# Patient Record
Sex: MALE | Race: WHITE | NOT HISPANIC OR LATINO | Employment: OTHER | URBAN - METROPOLITAN AREA
[De-identification: names, ages, dates, MRNs, and addresses within clinical notes are randomized per-mention and may not be internally consistent; named-entity substitution may affect disease eponyms.]

---

## 2017-03-13 ENCOUNTER — TRANSCRIBE ORDERS (OUTPATIENT)
Dept: ADMINISTRATIVE | Facility: HOSPITAL | Age: 52
End: 2017-03-13

## 2017-03-13 ENCOUNTER — OFFICE (OUTPATIENT)
Dept: URBAN - METROPOLITAN AREA CLINIC 75 | Facility: CLINIC | Age: 52
End: 2017-03-13

## 2017-03-13 VITALS
HEIGHT: 70 IN | SYSTOLIC BLOOD PRESSURE: 158 MMHG | HEART RATE: 68 BPM | WEIGHT: 223 LBS | DIASTOLIC BLOOD PRESSURE: 86 MMHG

## 2017-03-13 DIAGNOSIS — R68.81 EARLY SATIETY: ICD-10-CM

## 2017-03-13 DIAGNOSIS — R10.13 EPIGASTRIC PAIN: Primary | ICD-10-CM

## 2017-03-13 DIAGNOSIS — R10.13 EPIGASTRIC PAIN: ICD-10-CM

## 2017-03-13 DIAGNOSIS — K21.9 GASTRO-ESOPHAGEAL REFLUX DISEASE WITHOUT ESOPHAGITIS: ICD-10-CM

## 2017-03-13 PROCEDURE — 99244 OFF/OP CNSLTJ NEW/EST MOD 40: CPT | Performed by: INTERNAL MEDICINE

## 2017-03-13 RX ORDER — PANTOPRAZOLE SODIUM 40 MG/1
TABLET, DELAYED RELEASE ORAL
Qty: 60 | Refills: 12 | Status: COMPLETED
End: 2020-01-22

## 2017-03-16 ENCOUNTER — HOSPITAL ENCOUNTER (OUTPATIENT)
Dept: NUCLEAR MEDICINE | Facility: HOSPITAL | Age: 52
Discharge: HOME | End: 2017-03-16

## 2017-03-16 VITALS — BODY MASS INDEX: 31.57 KG/M2 | WEIGHT: 220 LBS

## 2017-03-16 DIAGNOSIS — R10.13 EPIGASTRIC PAIN: ICD-10-CM

## 2017-03-16 PROCEDURE — 25010000002 SINCALIDE PER 5 MCG: Performed by: INTERNAL MEDICINE

## 2017-03-16 PROCEDURE — A9537 TC99M MEBROFENIN: HCPCS | Performed by: INTERNAL MEDICINE

## 2017-03-16 PROCEDURE — 0 TECHNETIUM TC 99M MEBROFENIN KIT: Performed by: INTERNAL MEDICINE

## 2017-03-16 PROCEDURE — 78227 HEPATOBIL SYST IMAGE W/DRUG: CPT

## 2017-03-16 RX ORDER — KIT FOR THE PREPARATION OF TECHNETIUM TC 99M MEBROFENIN 45 MG/10ML
1 INJECTION, POWDER, LYOPHILIZED, FOR SOLUTION INTRAVENOUS
Status: COMPLETED | OUTPATIENT
Start: 2017-03-16 | End: 2017-03-16

## 2017-03-16 RX ADMIN — SINCALIDE 2 MCG: 5 INJECTION, POWDER, LYOPHILIZED, FOR SOLUTION INTRAVENOUS at 14:15

## 2017-03-16 RX ADMIN — MEBROFENIN 1 DOSE: 45 INJECTION, POWDER, LYOPHILIZED, FOR SOLUTION INTRAVENOUS at 13:15

## 2017-03-22 ENCOUNTER — HOSPITAL ENCOUNTER (OUTPATIENT)
Dept: NUCLEAR MEDICINE | Facility: HOSPITAL | Age: 52
Discharge: HOME | End: 2017-03-22

## 2017-03-22 DIAGNOSIS — R10.13 EPIGASTRIC PAIN: ICD-10-CM

## 2017-03-22 PROCEDURE — A9541 TC99M SULFUR COLLOID: HCPCS | Performed by: INTERNAL MEDICINE

## 2017-03-22 PROCEDURE — 0 TECHNETIUM SULFUR COLLOID: Performed by: INTERNAL MEDICINE

## 2017-03-22 PROCEDURE — 78264 GASTRIC EMPTYING IMG STUDY: CPT

## 2017-03-22 RX ADMIN — Medication 1 DOSE: at 07:50

## 2017-06-13 ENCOUNTER — TRANSCRIBE ORDERS (OUTPATIENT)
Dept: ADMINISTRATIVE | Facility: HOSPITAL | Age: 52
End: 2017-06-13

## 2017-06-13 DIAGNOSIS — R10.13 ABDOMINAL PAIN, EPIGASTRIC: Primary | ICD-10-CM

## 2017-06-21 ENCOUNTER — TRANSCRIBE ORDERS (OUTPATIENT)
Dept: LAB | Facility: HOSPITAL | Age: 52
End: 2017-06-21

## 2017-06-21 ENCOUNTER — HOSPITAL ENCOUNTER (OUTPATIENT)
Dept: GENERAL RADIOLOGY | Facility: HOSPITAL | Age: 52
Discharge: HOME | End: 2017-06-21
Admitting: INTERNAL MEDICINE

## 2017-06-21 ENCOUNTER — LAB (OUTPATIENT)
Dept: LAB | Facility: HOSPITAL | Age: 52
End: 2017-06-21

## 2017-06-21 DIAGNOSIS — R10.13 ABDOMINAL PAIN, EPIGASTRIC: Primary | ICD-10-CM

## 2017-06-21 DIAGNOSIS — R10.13 ABDOMINAL PAIN, EPIGASTRIC: ICD-10-CM

## 2017-06-21 DIAGNOSIS — IMO0001 BLOAT: ICD-10-CM

## 2017-06-21 PROCEDURE — 83516 IMMUNOASSAY NONANTIBODY: CPT

## 2017-06-21 PROCEDURE — 36415 COLL VENOUS BLD VENIPUNCTURE: CPT

## 2017-06-21 PROCEDURE — 74250 X-RAY XM SM INT 1CNTRST STD: CPT

## 2017-06-22 LAB
ENDOMYSIUM IGA SER QL: NEGATIVE
GLIADIN PEPTIDE IGA SER-ACNC: 9 UNITS (ref 0–19)
GLIADIN PEPTIDE IGG SER-ACNC: 3 UNITS (ref 0–19)
IGA SERPL-MCNC: 212 MG/DL (ref 90–386)
TTG IGA SER-ACNC: <2 U/ML (ref 0–3)
TTG IGG SER-ACNC: 3 U/ML (ref 0–5)

## 2017-07-10 RX ORDER — PANTOPRAZOLE SODIUM 40 MG/1
TABLET, DELAYED RELEASE ORAL
Qty: 30 TABLET | Refills: 0 | OUTPATIENT
Start: 2017-07-10

## 2017-07-18 RX ORDER — PANTOPRAZOLE SODIUM 40 MG/1
TABLET, DELAYED RELEASE ORAL
Qty: 30 TABLET | Refills: 0 | OUTPATIENT
Start: 2017-07-18

## 2020-01-22 ENCOUNTER — OFFICE (OUTPATIENT)
Dept: URBAN - METROPOLITAN AREA CLINIC 75 | Facility: CLINIC | Age: 55
End: 2020-01-22
Payer: MEDICARE

## 2020-01-22 VITALS
DIASTOLIC BLOOD PRESSURE: 84 MMHG | HEIGHT: 70 IN | RESPIRATION RATE: 18 BRPM | WEIGHT: 200 LBS | SYSTOLIC BLOOD PRESSURE: 134 MMHG

## 2020-01-22 DIAGNOSIS — K21.9 GASTRO-ESOPHAGEAL REFLUX DISEASE WITHOUT ESOPHAGITIS: ICD-10-CM

## 2020-01-22 DIAGNOSIS — R10.13 EPIGASTRIC PAIN: ICD-10-CM

## 2020-01-22 DIAGNOSIS — R68.81 EARLY SATIETY: ICD-10-CM

## 2020-01-22 DIAGNOSIS — R14.0 ABDOMINAL DISTENSION (GASEOUS): ICD-10-CM

## 2020-01-22 PROCEDURE — 99214 OFFICE O/P EST MOD 30 MIN: CPT | Performed by: NURSE PRACTITIONER

## 2021-03-08 ENCOUNTER — OFFICE (OUTPATIENT)
Dept: URBAN - METROPOLITAN AREA CLINIC 75 | Facility: CLINIC | Age: 56
End: 2021-03-08

## 2021-03-08 VITALS
HEIGHT: 70 IN | DIASTOLIC BLOOD PRESSURE: 82 MMHG | HEART RATE: 82 BPM | OXYGEN SATURATION: 97 % | WEIGHT: 214 LBS | RESPIRATION RATE: 12 BRPM | SYSTOLIC BLOOD PRESSURE: 122 MMHG | TEMPERATURE: 97.4 F

## 2021-03-08 DIAGNOSIS — R14.0 ABDOMINAL DISTENSION (GASEOUS): ICD-10-CM

## 2021-03-08 DIAGNOSIS — K59.00 CONSTIPATION, UNSPECIFIED: ICD-10-CM

## 2021-03-08 DIAGNOSIS — K21.9 GASTRO-ESOPHAGEAL REFLUX DISEASE WITHOUT ESOPHAGITIS: ICD-10-CM

## 2021-03-08 PROCEDURE — 99214 OFFICE O/P EST MOD 30 MIN: CPT | Performed by: INTERNAL MEDICINE

## 2021-03-08 RX ORDER — LINACLOTIDE 290 UG/1
CAPSULE, GELATIN COATED ORAL
Qty: 30 | Refills: 5 | Status: ACTIVE
Start: 2021-03-08

## 2021-04-01 ENCOUNTER — TRANSCRIBE ORDERS (OUTPATIENT)
Dept: ADMINISTRATIVE | Facility: HOSPITAL | Age: 56
End: 2021-04-01

## 2021-04-01 DIAGNOSIS — R14.0 BLOATING: Primary | ICD-10-CM

## 2021-04-13 ENCOUNTER — APPOINTMENT (OUTPATIENT)
Dept: NUCLEAR MEDICINE | Facility: HOSPITAL | Age: 56
End: 2021-04-13

## 2021-04-20 ENCOUNTER — HOSPITAL ENCOUNTER (OUTPATIENT)
Dept: NUCLEAR MEDICINE | Facility: HOSPITAL | Age: 56
Discharge: HOME | End: 2021-04-20

## 2021-04-20 DIAGNOSIS — R14.0 BLOATING: ICD-10-CM

## 2021-04-20 PROCEDURE — 0 TECHNETIUM TC 99M MEBROFENIN KIT: Performed by: INTERNAL MEDICINE

## 2021-04-20 PROCEDURE — 78226 HEPATOBILIARY SYSTEM IMAGING: CPT

## 2021-04-20 PROCEDURE — A9537 TC99M MEBROFENIN: HCPCS | Performed by: INTERNAL MEDICINE

## 2021-04-20 RX ORDER — KIT FOR THE PREPARATION OF TECHNETIUM TC 99M MEBROFENIN 45 MG/10ML
1 INJECTION, POWDER, LYOPHILIZED, FOR SOLUTION INTRAVENOUS
Status: COMPLETED | OUTPATIENT
Start: 2021-04-20 | End: 2021-04-20

## 2021-04-20 RX ADMIN — MEBROFENIN 1 DOSE: 45 INJECTION, POWDER, LYOPHILIZED, FOR SOLUTION INTRAVENOUS at 09:58

## 2021-05-20 ENCOUNTER — IMMUNIZATION (OUTPATIENT)
Dept: VACCINE CLINIC | Facility: HOSPITAL | Age: 56
End: 2021-05-20

## 2021-05-20 PROCEDURE — 91300 HC SARSCOV02 VAC 30MCG/0.3ML IM: CPT | Performed by: INTERNAL MEDICINE

## 2021-05-20 PROCEDURE — 0001A: CPT | Performed by: INTERNAL MEDICINE

## 2021-06-18 ENCOUNTER — APPOINTMENT (OUTPATIENT)
Dept: VACCINE CLINIC | Facility: HOSPITAL | Age: 56
End: 2021-06-18

## 2021-06-26 ENCOUNTER — APPOINTMENT (OUTPATIENT)
Dept: VACCINE CLINIC | Facility: HOSPITAL | Age: 56
End: 2021-06-26

## 2024-03-04 NOTE — H&P (VIEW-ONLY)
Subjective   Patient ID: Rajesh Springer is a 58 y.o. male is being seen for consultation today at the request of Holger Oliveira MD for 2nd opinion-cervical spinal stenosis of spinal canal. Patient had a MRI C-spine on 1/31/2024 @ [facility].    Treatment: No recent treatment    Today patient states that he has neck pain that radiates to B/L shoulders    History of Present Illness    Patient has been having pain in his neck with radiation into his shoulder since 2018.  He has done physical therapy which made him worse.  He has seen a chiropractor which did not help either.  He has never had any injections.  The pain is located in his neck and radiates into both of his shoulders.  It really does not radiate down his arms.    The following portions of the patient's history were reviewed and updated as appropriate: allergies, current medications, past family history, past medical history, past social history, past surgical history, and problem list.    Review of Systems   Constitutional:  Negative for chills and fever.   HENT:  Negative for congestion.    Musculoskeletal:  Positive for myalgias, neck pain and neck stiffness.   Neurological:  Negative for weakness, numbness and headaches.       I reviewed the review of systems listed by the patient and discussed by my MA    Objective     There were no vitals filed for this visit.  There is no height or weight on file to calculate BMI.    Tobacco Use: Medium Risk (3/12/2024)    Patient History     Smoking Tobacco Use: Former     Smokeless Tobacco Use: Unknown     Passive Exposure: Not on file          Physical Exam  Constitutional:       Appearance: He is well-developed.   HENT:      Head: Normocephalic and atraumatic.   Eyes:      Extraocular Movements: EOM normal.      Conjunctiva/sclera: Conjunctivae normal.      Pupils: Pupils are equal, round, and reactive to light.   Neck:      Vascular: No carotid bruit.   Neurological:      Mental Status: He is  oriented to person, place, and time.      Coordination: Finger-Nose-Finger Test and Heel to Shin Test normal.      Gait: Gait is intact.      Deep Tendon Reflexes:      Reflex Scores:       Tricep reflexes are 2+ on the right side and 2+ on the left side.       Bicep reflexes are 2+ on the right side and 2+ on the left side.       Brachioradialis reflexes are 2+ on the right side and 2+ on the left side.       Patellar reflexes are 2+ on the right side and 2+ on the left side.       Achilles reflexes are 2+ on the right side and 2+ on the left side.  Psychiatric:         Speech: Speech normal.       Neurologic Exam     Mental Status   Oriented to person, place, and time.   Registration of memory: Good recent and remote memory.   Attention: normal. Concentration: normal.   Speech: speech is normal   Level of consciousness: alert  Knowledge: consistent with education.     Cranial Nerves     CN II   Visual fields full to confrontation.   Visual acuity: normal    CN III, IV, VI   Pupils are equal, round, and reactive to light.  Extraocular motions are normal.     CN V   Facial sensation intact.   Right corneal reflex: normal  Left corneal reflex: normal    CN VII   Facial expression full, symmetric.   Right facial weakness: none  Left facial weakness: none    CN VIII   Hearing: intact    CN IX, X   Palate: symmetric    CN XI   Right sternocleidomastoid strength: normal  Left sternocleidomastoid strength: normal    CN XII   Tongue: not atrophic  Tongue deviation: none    Motor Exam   Muscle bulk: normal  Right arm tone: normal  Left arm tone: normal  Right leg tone: normal  Left leg tone: normal    Strength   Strength 5/5 except as noted.     Sensory Exam   Light touch normal.     Gait, Coordination, and Reflexes     Gait  Gait: normal    Coordination   Finger to nose coordination: normal  Heel to shin coordination: normal    Reflexes   Right brachioradialis: 2+  Left brachioradialis: 2+  Right biceps: 2+  Left biceps:  2+  Right triceps: 2+  Left triceps: 2+  Right patellar: 2+  Left patellar: 2+  Right achilles: 2+  Left achilles: 2+  Right : 2+  Left : 2+          Assessment & Plan   Independent Review of Radiographic Studies:      I personally reviewed the images from the following studies.    I reviewed an MRI of the cervical spine done on January 31 of this year.  On the sagittal images there is some narrowing at C4-5 and C5-6 particularly.  On the axial images C2-3 is widely open.  C3-4 is open as well.  C4-5 does show a midline disc bulge that is distorting the cord a little bit but not severely so.  The foramina are fairly open.  C5-6 shows more seen difficult narrowing with some cord compression neuroforaminal stenosis worse on the left than the right.  C6-7 shows bilateral foraminal stenosis and C7-T1 really looks pretty good.    Medical Decision Making:      I told the patient that because of the cord compression at C5-6 I think we should get a myelogram done to be sure about how much pressure there is on the spinal cord.  I told the patient what a myelogram involves.  I explained that there is a 50% chance of developing a bad headache and nausea as a result of the test.  I explained that there is also a very small chance of infection, seizures, and bleeding.  I explained how we would treat a post myelogram headache including bedrest, caffeinated fluids, steroids, and blood patch.  The patient does ask to proceed.    Diagnoses and all orders for this visit:    1. Cervical spinal stenosis (Primary)  -     Obtain Informed Consent; Standing  -     IR Myelogram Cervical Spine; Future  -     CT Cervical Spine With Intrathecal Contrast; Future  -     XR Spine Cervical Complete With Flex Ext; Future  -     No Lab Testing Needed; Standing  -     dexAMETHasone (DECADRON) 4 MG tablet; Take 2 tablets by mouth Take As Directed. Take both tablets by mouth 2 hours before myelogram  Dispense: 2 tablet; Refill: 0      Return  for After radiology test.

## 2024-03-04 NOTE — PROGRESS NOTES
Subjective   Patient ID: Rajesh Springer is a 58 y.o. male is being seen for consultation today at the request of Holger Oliveira MD for 2nd opinion-cervical spinal stenosis of spinal canal. Patient had a MRI C-spine on 1/31/2024 @ [facility].    Treatment: No recent treatment    Today patient states that he has neck pain that radiates to B/L shoulders    History of Present Illness    Patient has been having pain in his neck with radiation into his shoulder since 2018.  He has done physical therapy which made him worse.  He has seen a chiropractor which did not help either.  He has never had any injections.  The pain is located in his neck and radiates into both of his shoulders.  It really does not radiate down his arms.    The following portions of the patient's history were reviewed and updated as appropriate: allergies, current medications, past family history, past medical history, past social history, past surgical history, and problem list.    Review of Systems   Constitutional:  Negative for chills and fever.   HENT:  Negative for congestion.    Musculoskeletal:  Positive for myalgias, neck pain and neck stiffness.   Neurological:  Negative for weakness, numbness and headaches.       I reviewed the review of systems listed by the patient and discussed by my MA    Objective     There were no vitals filed for this visit.  There is no height or weight on file to calculate BMI.    Tobacco Use: Medium Risk (3/12/2024)    Patient History     Smoking Tobacco Use: Former     Smokeless Tobacco Use: Unknown     Passive Exposure: Not on file          Physical Exam  Constitutional:       Appearance: He is well-developed.   HENT:      Head: Normocephalic and atraumatic.   Eyes:      Extraocular Movements: EOM normal.      Conjunctiva/sclera: Conjunctivae normal.      Pupils: Pupils are equal, round, and reactive to light.   Neck:      Vascular: No carotid bruit.   Neurological:      Mental Status: He is  oriented to person, place, and time.      Coordination: Finger-Nose-Finger Test and Heel to Shin Test normal.      Gait: Gait is intact.      Deep Tendon Reflexes:      Reflex Scores:       Tricep reflexes are 2+ on the right side and 2+ on the left side.       Bicep reflexes are 2+ on the right side and 2+ on the left side.       Brachioradialis reflexes are 2+ on the right side and 2+ on the left side.       Patellar reflexes are 2+ on the right side and 2+ on the left side.       Achilles reflexes are 2+ on the right side and 2+ on the left side.  Psychiatric:         Speech: Speech normal.       Neurologic Exam     Mental Status   Oriented to person, place, and time.   Registration of memory: Good recent and remote memory.   Attention: normal. Concentration: normal.   Speech: speech is normal   Level of consciousness: alert  Knowledge: consistent with education.     Cranial Nerves     CN II   Visual fields full to confrontation.   Visual acuity: normal    CN III, IV, VI   Pupils are equal, round, and reactive to light.  Extraocular motions are normal.     CN V   Facial sensation intact.   Right corneal reflex: normal  Left corneal reflex: normal    CN VII   Facial expression full, symmetric.   Right facial weakness: none  Left facial weakness: none    CN VIII   Hearing: intact    CN IX, X   Palate: symmetric    CN XI   Right sternocleidomastoid strength: normal  Left sternocleidomastoid strength: normal    CN XII   Tongue: not atrophic  Tongue deviation: none    Motor Exam   Muscle bulk: normal  Right arm tone: normal  Left arm tone: normal  Right leg tone: normal  Left leg tone: normal    Strength   Strength 5/5 except as noted.     Sensory Exam   Light touch normal.     Gait, Coordination, and Reflexes     Gait  Gait: normal    Coordination   Finger to nose coordination: normal  Heel to shin coordination: normal    Reflexes   Right brachioradialis: 2+  Left brachioradialis: 2+  Right biceps: 2+  Left biceps:  2+  Right triceps: 2+  Left triceps: 2+  Right patellar: 2+  Left patellar: 2+  Right achilles: 2+  Left achilles: 2+  Right : 2+  Left : 2+          Assessment & Plan   Independent Review of Radiographic Studies:      I personally reviewed the images from the following studies.    I reviewed an MRI of the cervical spine done on January 31 of this year.  On the sagittal images there is some narrowing at C4-5 and C5-6 particularly.  On the axial images C2-3 is widely open.  C3-4 is open as well.  C4-5 does show a midline disc bulge that is distorting the cord a little bit but not severely so.  The foramina are fairly open.  C5-6 shows more seen difficult narrowing with some cord compression neuroforaminal stenosis worse on the left than the right.  C6-7 shows bilateral foraminal stenosis and C7-T1 really looks pretty good.    Medical Decision Making:      I told the patient that because of the cord compression at C5-6 I think we should get a myelogram done to be sure about how much pressure there is on the spinal cord.  I told the patient what a myelogram involves.  I explained that there is a 50% chance of developing a bad headache and nausea as a result of the test.  I explained that there is also a very small chance of infection, seizures, and bleeding.  I explained how we would treat a post myelogram headache including bedrest, caffeinated fluids, steroids, and blood patch.  The patient does ask to proceed.    Diagnoses and all orders for this visit:    1. Cervical spinal stenosis (Primary)  -     Obtain Informed Consent; Standing  -     IR Myelogram Cervical Spine; Future  -     CT Cervical Spine With Intrathecal Contrast; Future  -     XR Spine Cervical Complete With Flex Ext; Future  -     No Lab Testing Needed; Standing  -     dexAMETHasone (DECADRON) 4 MG tablet; Take 2 tablets by mouth Take As Directed. Take both tablets by mouth 2 hours before myelogram  Dispense: 2 tablet; Refill: 0      Return  for After radiology test.

## 2024-03-12 ENCOUNTER — TELEPHONE (OUTPATIENT)
Dept: NEUROSURGERY | Facility: CLINIC | Age: 59
End: 2024-03-12

## 2024-03-12 ENCOUNTER — OFFICE VISIT (OUTPATIENT)
Dept: NEUROSURGERY | Facility: CLINIC | Age: 59
End: 2024-03-12
Payer: COMMERCIAL

## 2024-03-12 DIAGNOSIS — M48.02 CERVICAL SPINAL STENOSIS: Primary | ICD-10-CM

## 2024-03-12 PROCEDURE — 99204 OFFICE O/P NEW MOD 45 MIN: CPT | Performed by: NEUROLOGICAL SURGERY

## 2024-03-12 RX ORDER — DEXAMETHASONE 4 MG/1
8 TABLET ORAL TAKE AS DIRECTED
Qty: 2 TABLET | Refills: 0 | Status: SHIPPED | OUTPATIENT
Start: 2024-03-12

## 2024-03-15 ENCOUNTER — TELEPHONE (OUTPATIENT)
Dept: NEUROSURGERY | Facility: CLINIC | Age: 59
End: 2024-03-15
Payer: COMMERCIAL

## 2024-03-15 NOTE — TELEPHONE ENCOUNTER
"Brandy LM for patient informing per Dr. Delatorre    \"Just tell him not to take the Plavix after today.  He should be okay for next week.  He can continue on the baby aspirin.\"      "

## 2024-03-15 NOTE — TELEPHONE ENCOUNTER
Patient states he was suppose to be off his Plavix and baby aspirin 7 days before his myelogram. States he forgot to do so and took meds today and the last two days as well. Please call and advise.

## 2024-03-15 NOTE — PROGRESS NOTES
03/21/24 0001   Pre-Procedure Phone Call   Procedure Time Verified Yes   Arrival Time 0600   Procedure Location Verified Yes   Medical History Reviewed No   NPO Status Reinforced Yes   Ride and Caregiver Arranged Yes   Patient Knows to Bring Current Medications   (No changes in medications since last reviewed. adecadron RX'd. Patient is calling office did not stop Plavix will only be 5 day hold. Bubble message sent.)

## 2024-03-21 ENCOUNTER — HOSPITAL ENCOUNTER (OUTPATIENT)
Dept: GENERAL RADIOLOGY | Facility: HOSPITAL | Age: 59
Discharge: HOME | End: 2024-03-21
Payer: COMMERCIAL

## 2024-03-21 ENCOUNTER — HOSPITAL ENCOUNTER (OUTPATIENT)
Dept: CT IMAGING | Facility: HOSPITAL | Age: 59
Discharge: HOME | End: 2024-03-21
Payer: COMMERCIAL

## 2024-03-21 VITALS
TEMPERATURE: 97.7 F | SYSTOLIC BLOOD PRESSURE: 128 MMHG | DIASTOLIC BLOOD PRESSURE: 74 MMHG | HEART RATE: 63 BPM | HEIGHT: 70 IN | OXYGEN SATURATION: 96 % | WEIGHT: 183 LBS | BODY MASS INDEX: 26.2 KG/M2 | RESPIRATION RATE: 16 BRPM

## 2024-03-21 DIAGNOSIS — M48.02 CERVICAL SPINAL STENOSIS: ICD-10-CM

## 2024-03-21 PROCEDURE — 72240 MYELOGRAPHY NECK SPINE: CPT

## 2024-03-21 PROCEDURE — 62302 MYELOGRAPHY LUMBAR INJECTION: CPT

## 2024-03-21 PROCEDURE — 72126 CT NECK SPINE W/DYE: CPT

## 2024-03-21 PROCEDURE — 25010000002 LIDOCAINE 1 % SOLUTION: Performed by: NEUROLOGICAL SURGERY

## 2024-03-21 PROCEDURE — 72052 X-RAY EXAM NECK SPINE 6/>VWS: CPT

## 2024-03-21 PROCEDURE — 25510000001 IOPAMIDOL 61 % SOLUTION: Performed by: NEUROLOGICAL SURGERY

## 2024-03-21 RX ORDER — IOPAMIDOL 612 MG/ML
15 INJECTION, SOLUTION INTRATHECAL
Status: COMPLETED | OUTPATIENT
Start: 2024-03-21 | End: 2024-03-21

## 2024-03-21 RX ORDER — ACETAMINOPHEN 325 MG/1
650 TABLET ORAL EVERY 4 HOURS PRN
Status: DISCONTINUED | OUTPATIENT
Start: 2024-03-21 | End: 2024-03-22 | Stop reason: HOSPADM

## 2024-03-21 RX ORDER — HYDROCODONE BITARTRATE AND ACETAMINOPHEN 5; 325 MG/1; MG/1
1 TABLET ORAL EVERY 4 HOURS PRN
Status: DISCONTINUED | OUTPATIENT
Start: 2024-03-21 | End: 2024-03-22 | Stop reason: HOSPADM

## 2024-03-21 RX ORDER — LIDOCAINE HYDROCHLORIDE 10 MG/ML
10 INJECTION, SOLUTION INFILTRATION; PERINEURAL ONCE
Status: COMPLETED | OUTPATIENT
Start: 2024-03-21 | End: 2024-03-21

## 2024-03-21 RX ADMIN — LIDOCAINE HYDROCHLORIDE 4 ML: 10 INJECTION, SOLUTION INFILTRATION; PERINEURAL at 07:24

## 2024-03-21 RX ADMIN — IOPAMIDOL 15 ML: 612 INJECTION, SOLUTION INTRATHECAL at 07:26

## 2024-03-21 NOTE — POST-PROCEDURE NOTE
Preop diagnosis:  Cervical radiculopathy  Postop diagnosis:  same  LP at L45  10 cc of 300M Isovue  Complications: none  EBL: 0 cc  Specimens: none

## 2024-03-21 NOTE — DISCHARGE INSTRUCTIONS
EDUCATION /DISCHARGE INSTRUCTIONS:    A myelogram is a special radiology procedure of the spinal cord, spinal nerves and other related structures.  You will be awake during the examination.  An area of your lower back will be cleansed with an antiseptic solution.  The physician will inject a numbing medication in your lower back.  While your back is numb, a needle will be placed in the lower back area.  A small amount of spinal fluid may be withdrawn and sent to the lab if ordered by your physician. While the needle is in the back, an injection of a contrast material (xray dye) will be given through the needle.  The contrast material will allow the physician to see the spinal cord and spinal nerves.  Once injected, the needle will be removed and a band aid will be placed over the injection site.  The table will be tilted during the process to allow the contrast material to flow to particular areas in the spine.  Following the injection and xrays, you will be taken to the CT scanner where more pictures will be taken. After the procedure is finished, the contrast material will be absorbed by your body and eliminated through your kidneys.  The radiologist will study and interpret your myelogram and send the results to your physician.  Procedure risks of a myelogram include, but are not limited to:  *  Bleeding   *  Seizure  *  Infection   *  Headache, possibly severe requiring a blood patch  *  Contrast reaction  *  Nerve or cord injury  *  Paralysis and death    Benefits of the procedure:  *  Best examination for delineating pathology related to spinal cord compression from a disc and/or nerve root compression  Alternatives to the procedure:  MRI - a non invasive procedure requiring intravenous contrast injection.  Cannot be done on patients with certain pacemakers or metal in the body.  MRI risks include possible reaction to the contrast material, movement of metal located in the body.Benefit to MRI:  Non-invasive  and usually painless procedure.    24 hour rest period ends _Friday, March 22, 2024 at 10:00 AM_.    Important information following your myelogram:  * ACTIVITY:   *  You may sit up in the car to go home.  *  When you get home, remain on bed rest (flat on your back or on your side) for 24 hours. You may place a rolled up towel under your neck for support  * You may get up to the bathroom and sit up to eat and drink then lie back down  * Drink additional fluids for 24 hours after the myelogram.   * Continue to drink additional fluids for the next 2-3 days. Water and caffeinated beverages are encouraged.  * Remain less active for the next two to three days.  * Do not drive for 24 hours following a myelogram.  * You may remove the bandage and shower in the morning.    CALL YOUR PHYSICIAN FOR THE FOLLOWING:  * Pain at the injection site  * Redness, swelling, bruising or drainage at the injection site.  * A fever by mouth of 101.0 or any new symptoms  Headaches are a common side effect after a myelogram.  If you get a headache, you should stay flat in bed and drink plenty of fluids. If the headache persists and does not go away with rest/medication, CALL Dr. Delatorre at [phone].

## 2024-03-22 ENCOUNTER — TELEPHONE (OUTPATIENT)
Dept: INTERVENTIONAL RADIOLOGY/VASCULAR | Facility: HOSPITAL | Age: 59
End: 2024-03-22
Payer: COMMERCIAL

## 2024-03-22 NOTE — PROGRESS NOTES
Subjective   Patient ID: Rajesh Springer is a 58 y.o. male is here today for follow-up with a new Cervical Myelogram done on 3/21/2024.    Today patient's symptoms are unchanged, patient is having neck pain that radiates to B/L upper extremities     History of Present Illness    This patient continues with pain in his neck with radiation into his shoulder.  Radiates into both of his shoulders.  It does not radiate very much down his arms.    The following portions of the patient's history were reviewed and updated as appropriate: allergies, current medications, past family history, past medical history, past social history, past surgical history, and problem list.    Review of Systems    I reviewed the review of systems listed by the patient and discussed by my MA    Objective     There were no vitals filed for this visit.  There is no height or weight on file to calculate BMI.    Tobacco Use: Medium Risk (3/26/2024)    Patient History     Smoking Tobacco Use: Former     Smokeless Tobacco Use: Unknown     Passive Exposure: Not on file          Physical Exam  Neurological:      Mental Status: He is alert and oriented to person, place, and time.       Neurologic Exam     Mental Status   Oriented to person, place, and time.           Assessment & Plan   Independent Review of Radiographic Studies:      I personally reviewed the images from the following studies.    I reviewed his plain films, myelogram, and CT scan myself.  The plain films show marked degenerative change with significant anterior osteophytic deterioration.  There is significant deviation of the esophagus anteriorly.  I do not see any evidence of instability or abnormal movement on flexion and extension.  On the myelogram itself there is very poor opacification of the cervical spinal canal.  On the post myelographic CT scan C2-3 is widely open.  C3-4 is open as well.  C4-5 is also open.  C5-6 does show some central narrowing with some flattening of  the cord and bilateral foraminal stenosis.  C6-7 also shows central narrowing with bilateral foraminal stenosis.  C7-T1 looks okay.    I again reviewed his MRI which was done in January.  This looks quite similar to the current images.  There is significant narrowing at C5-6 and C6-7.    Medical Decision Making:      I told the patient about the imaging.  I told him that we may ultimately need to look at his thoracic and his lumbar spine with an MRI because of the inability to get the contrast in a good concentration up that high but realistically he has no symptoms in those areas and so I do not know that we need to jump into imaging.  On the other hand I do think his symptoms are coming from C5-6 and C6-7 and I see little option but to proceed with surgery there.  I told the patient about the surgery which is called an anterior cervical discectomy.  I explained that there is an 80% chance of getting rid of the arm pain and any other arm symptoms.  I also explained that the patient would still have neck pain.  Initially this will be quite severe however it will improve with healing from the surgery.  There is also a risk of infection, bleeding, paralysis, and anesthetic risk.  There is a risk of damage to the soft tissues of the neck such as the esophagus, carotids, and trachea.  All of these risks are about 2 or 3%.  There is about a 5% chance of nonunion or failure of the instrumentation.  There is also a about a 5% chance of hoarseness and trouble swallowing do to damage to the recurrent laryngeal nerve.  We discussed the postoperative hospital and home course as well.  The patient does ask to proceed.    He will need to be scheduled for a: Cervical 5 to cervical 6 and cervical 6 to cervical 7 anterior cervical discectomy, fusion and instrumentation    Diagnoses and all orders for this visit:    1. Cervical spinal stenosis (Primary)      Return for 2-3 week post op.

## 2024-03-26 ENCOUNTER — TELEPHONE (OUTPATIENT)
Dept: NEUROSURGERY | Facility: CLINIC | Age: 59
End: 2024-03-26

## 2024-03-26 ENCOUNTER — OFFICE VISIT (OUTPATIENT)
Dept: NEUROSURGERY | Facility: CLINIC | Age: 59
End: 2024-03-26
Payer: COMMERCIAL

## 2024-03-26 ENCOUNTER — PREP FOR SURGERY (OUTPATIENT)
Dept: OTHER | Facility: HOSPITAL | Age: 59
End: 2024-03-26
Payer: COMMERCIAL

## 2024-03-26 DIAGNOSIS — M48.02 CERVICAL SPINAL STENOSIS: Primary | ICD-10-CM

## 2024-03-26 PROCEDURE — 99214 OFFICE O/P EST MOD 30 MIN: CPT | Performed by: NEUROLOGICAL SURGERY

## 2024-03-28 ENCOUNTER — TELEPHONE (OUTPATIENT)
Dept: NEUROSURGERY | Facility: CLINIC | Age: 59
End: 2024-03-28
Payer: COMMERCIAL

## 2024-03-28 NOTE — TELEPHONE ENCOUNTER
Spoke with patient regarding rescheduling his surgery.   Principal Discharge DX:	Cellulitis of right lower extremity  Goal:	Return to Baseline ADLs  Instructions for follow-up, activity and diet:	Full weight bearing as tolerated on Right lower extremity    WOUND CARE INSTRUCTIONS:    1. Flush right foot wound with normal saline, as tolerated.  2. Dab dry with sterile 4x4 gauze, as tolerated.  3. Apply Santyl with nickel-thickness, directly to wound bed (do not apply to periwound area).  4. Apply 4x4 gauze and then ABD pad, over Santyl ointment.  5. Wrap dressings with 4" emily and secure with medical tape.  6. Monitor for infections.  7. Repeat steps 1-6 daily.    Follow up with PMD in one week after discharge  Secondary Diagnosis:	Atrial fibrillation  Secondary Diagnosis:	Trauma  Secondary Diagnosis:	Glaucoma  Secondary Diagnosis:	C. difficile diarrhea  Secondary Diagnosis:	Need for prophylactic measure Principal Discharge DX:	Cellulitis of right lower extremity  Goal:	Return to Baseline ADLs  Instructions for follow-up, activity and diet:	Full weight bearing as tolerated on Right lower extremity    WOUND CARE INSTRUCTIONS:    1. Flush right foot wound with normal saline, as tolerated.  2. Dab dry with sterile 4x4 gauze, as tolerated.  3. Apply Santyl with nickel-thickness, directly to wound bed (do not apply to periwound area).  4. Apply 4x4 gauze and then ABD pad, over Santyl ointment.  5. Wrap dressings with 4" emily and secure with medical tape.  6. Monitor for infections.  7. Repeat steps 1-6 daily.    Follow up with PMD in one week after discharge  Secondary Diagnosis:	Atrial fibrillation  Instructions for follow-up, activity and diet:	Chronic, stable during this admission  Patient was counseled on importance of anticoagulation and taking her prescribed metoprolol, however she refuses anticoagulation and does not take metoprolol at home  Secondary Diagnosis:	Trauma  Instructions for follow-up, activity and diet:	Continue wound care treatment as mentioned above  Secondary Diagnosis:	Glaucoma  Instructions for follow-up, activity and diet:	Continue brimonidine and latanoprost  Secondary Diagnosis:	C. difficile diarrhea  Instructions for follow-up, activity and diet:	History of C. diff diarrhea.  Currently controlled  Secondary Diagnosis:	Need for prophylactic measure  Instructions for follow-up, activity and diet:	Patient refuses home anticoagulation as mentioned above Principal Discharge DX:	Cellulitis of right lower extremity  Goal:	Return to Baseline ADLs  Instructions for follow-up, activity and diet:	Full weight bearing as tolerated on Right lower extremity    WOUND CARE INSTRUCTIONS:    1. Flush right foot wound with normal saline, as tolerated.  2. Dab dry with sterile 4x4 gauze, as tolerated.  3. Apply Santyl with nickel-thickness, directly to wound bed (do not apply to periwound area).  4. Apply 4x4 gauze and then ABD pad, over Santyl ointment.  5. Wrap dressings with 4" emily and secure with medical tape.  6. Monitor for infections.  7. Repeat steps 1-6 daily.    Cont, Keflex for 7 days after discharge  Follow up with PMD and wound care in one week after discharge  Secondary Diagnosis:	Atrial fibrillation  Instructions for follow-up, activity and diet:	Chronic, stable during this admission  Patient was counseled on importance of anticoagulation and taking her prescribed metoprolol, however she refuses anticoagulation and does not take metoprolol at home  Secondary Diagnosis:	Trauma  Instructions for follow-up, activity and diet:	Continue wound care treatment as mentioned above  Secondary Diagnosis:	Glaucoma  Instructions for follow-up, activity and diet:	Continue brimonidine and latanoprost  Secondary Diagnosis:	C. difficile diarrhea  Instructions for follow-up, activity and diet:	History of C. diff diarrhea.  Currently controlled  Secondary Diagnosis:	Need for prophylactic measure  Instructions for follow-up, activity and diet:	Patient refuses home anticoagulation as mentioned above Principal Discharge DX:	Cellulitis of right lower extremity  Goal:	Return to Baseline ADLs  Instructions for follow-up, activity and diet:	Full weight bearing as tolerated on Right lower extremity    WOUND CARE INSTRUCTIONS:    1. Flush right foot wound with normal saline, as tolerated.  2. Dab dry with sterile 4x4 gauze, as tolerated.  3. Apply Santyl with nickel-thickness, directly to wound bed (do not apply to periwound area).  4. Apply 4x4 gauze and then ABD pad, over Santyl ointment.  5. Wrap dressings with 4" emily and secure with medical tape.  6. Monitor for infections.  7. Repeat steps 1-6 daily.    Continue antibiotics for 7 days after discharge  Follow up with wound care in one week after discharge, call office for appointment  Follow up with PMD in one week after discharge, call office for appointment  Secondary Diagnosis:	Atrial fibrillation  Instructions for follow-up, activity and diet:	Chronic, stable during this admission  Patient was counseled on importance of anticoagulation and taking her prescribed metoprolol, however she refuses anticoagulation and does not take metoprolol at home  Secondary Diagnosis:	Trauma  Instructions for follow-up, activity and diet:	Continue wound care treatment as mentioned above  Follow up with wound care as outpatient in one week  Secondary Diagnosis:	Glaucoma  Instructions for follow-up, activity and diet:	Continue brimonidine and latanoprost  Secondary Diagnosis:	C. difficile diarrhea  Instructions for follow-up, activity and diet:	History of C. diff diarrhea.  Currently controlled  Secondary Diagnosis:	Need for prophylactic measure  Instructions for follow-up, activity and diet:	Patient refuses home anticoagulation as mentioned above Principal Discharge DX:	Cellulitis of right lower extremity  Goal:	Return to Baseline ADLs  Instructions for follow-up, activity and diet:	Full weight bearing as tolerated on Right lower extremity  cont oral antibiotics as prescribed and recommended by ID    WOUND CARE INSTRUCTIONS:    1. Flush right foot wound with normal saline, as tolerated.  2. Dab dry with sterile 4x4 gauze, as tolerated.  3. Apply Santyl with nickel-thickness, directly to wound bed (do not apply to periwound area).  4. Apply 4x4 gauze and then ABD pad, over Santyl ointment.  5. Wrap dressings with 4" emily and secure with medical tape.  6. Monitor for infections.  7. Repeat steps 1-6 daily.    Continue antibiotics for 7 days after discharge  Follow up with wound care in one week after discharge, call office for appointment  Follow up with PMD in one week after discharge, call office for appointment  Secondary Diagnosis:	Atrial fibrillation  Instructions for follow-up, activity and diet:	Chronic, stable during this admission  Patient was counseled on importance of anticoagulation and taking her prescribed metoprolol, however she refuses anticoagulation and does not take metoprolol at home  Secondary Diagnosis:	Trauma  Instructions for follow-up, activity and diet:	Continue wound care treatment as mentioned above  Follow up with wound care as outpatient in one week  Secondary Diagnosis:	Glaucoma  Instructions for follow-up, activity and diet:	Continue brimonidine and latanoprost  Secondary Diagnosis:	C. difficile diarrhea  Instructions for follow-up, activity and diet:	History of C. diff diarrhea.  Currently controlled  Secondary Diagnosis:	Need for prophylactic measure  Instructions for follow-up, activity and diet:	Patient refuses home anticoagulation as mentioned above

## 2024-03-28 NOTE — TELEPHONE ENCOUNTER
Patient called stating he realizes the dates for his fusion will not work, please call and reschedule or advise how to change appointment

## 2024-04-12 NOTE — PROGRESS NOTES
Subjective   Patient ID: Rajesh Springer is a 58 y.o. male is here today for PRE-OP  follow-up. Patient is considering having a Cervical 5 to cervical 6 and cervical 6 to cervical 7 anterior cervical discectomy, fusion and instrumentation on 5/8/2024    History of Present Illness    This patient returns today.  He continues with pain in his neck with radiation into his shoulder.  It goes into both of his shoulders but not very much down his arms.  The pain has been getting more severe.  He is still not sure if he wants to proceed with surgery or not.    The following portions of the patient's history were reviewed and updated as appropriate: allergies, current medications, past family history, past medical history, past social history, past surgical history, and problem list.    Review of Systems   Constitutional:  Negative for chills and fever.   HENT:  Negative for congestion.    Musculoskeletal:  Positive for neck pain and neck stiffness.   Neurological:  Positive for weakness and numbness.       I reviewed the review of systems listed by the patient and discussed by my MA    Objective     There were no vitals filed for this visit.  There is no height or weight on file to calculate BMI.    Tobacco Use: Medium Risk (4/16/2024)    Patient History     Smoking Tobacco Use: Former     Smokeless Tobacco Use: Unknown     Passive Exposure: Not on file          Physical Exam  Neurological:      Mental Status: He is alert and oriented to person, place, and time.       Neurologic Exam     Mental Status   Oriented to person, place, and time.           Assessment & Plan   Independent Review of Radiographic Studies:      I personally reviewed the images from the following studies.    I again reviewed his plain films, myelogram, and CT scan myself.  This shows very large anterior osteophytes from C3 all the way down to C6.  On the post myelographic CT scan there is central narrowing and flattening of the cord with bilateral  foraminal stenosis and this is also true at C6-7.  The other levels mostly look okay except for the large anterior osteophytes.    Medical Decision Making:      I told the patient about the imaging.  I told him that from my point of view if we were to do any surgery we would do a discectomy at C5-6 and C6-7.  We will try to remove as much of the anterior osteophytes as we could but I do not think it would be all of them.  I told the patient about the surgery which is called an anterior cervical discectomy.  I explained that there is an 80% chance of getting rid of the arm pain and any other arm symptoms.  I also explained that the patient would still have neck pain.  Initially this will be quite severe however it will improve with healing from the surgery.  There is also a risk of infection, bleeding, paralysis, and anesthetic risk.  There is a risk of damage to the soft tissues of the neck such as the esophagus, carotids, and trachea.  All of these risks are about 2 or 3%.  There is about a 5% chance of nonunion or failure of the instrumentation.  There is also a about a 5% chance of hoarseness and trouble swallowing do to damage to the recurrent laryngeal nerve.  We discussed the postoperative hospital and home course as well.  The patient does ask to think about it and will call if he wishes to proceed.    If he calls he will need to be scheduled for a:  Cervical 5 to cervical 6 and cervical 6 to cervical 7 anterior cervical discectomy, fusion and instrumentation removal of anterior osteophytes    Diagnoses and all orders for this visit:    1. Cervical spinal stenosis (Primary)      Return for 2-3 week post op.

## 2024-04-16 ENCOUNTER — OFFICE VISIT (OUTPATIENT)
Dept: NEUROSURGERY | Facility: CLINIC | Age: 59
End: 2024-04-16
Payer: COMMERCIAL

## 2024-04-16 DIAGNOSIS — M48.02 CERVICAL SPINAL STENOSIS: Primary | ICD-10-CM

## 2024-10-22 ENCOUNTER — OFFICE VISIT (OUTPATIENT)
Dept: NEUROSURGERY | Facility: CLINIC | Age: 59
End: 2024-10-22
Payer: COMMERCIAL

## 2024-10-22 DIAGNOSIS — M48.02 CERVICAL SPINAL STENOSIS: Primary | ICD-10-CM

## 2024-10-22 PROCEDURE — 99214 OFFICE O/P EST MOD 30 MIN: CPT | Performed by: NEUROLOGICAL SURGERY

## 2024-10-22 NOTE — PROGRESS NOTES
Subjective   Patient ID: Rajesh Springer is a 59 y.o. male is here today for PRE-OP  follow-up. Patient is considering having a Cervical 5 to cervical 6 and cervical 6 to cervical 7 anterior cervical discectomy, fusion and instrumentation on 11/6/2024    History of Present Illness    This patient returns today.  He continues with pain in his neck particularly into the shoulder.  This is mostly on the left but some on the right as well.  It does not really go down his arms all that much.  He does have some numbness in his hands.    The following portions of the patient's history were reviewed and updated as appropriate: allergies, current medications, past family history, past medical history, past social history, past surgical history, and problem list.    Review of Systems   Constitutional:  Negative for chills and fever.   HENT:  Negative for congestion.    Musculoskeletal:  Positive for neck pain and neck stiffness.   Neurological:  Positive for weakness and numbness.       I reviewed the review of systems listed by the patient and discussed by my MA    Objective     There were no vitals filed for this visit.  There is no height or weight on file to calculate BMI.    Tobacco Use: Medium Risk (10/22/2024)    Patient History     Smoking Tobacco Use: Former     Smokeless Tobacco Use: Never     Passive Exposure: Past          Physical Exam  Eyes:      General: Lids are normal.      Extraocular Movements: Extraocular movements intact.      Pupils: Pupils are equal, round, and reactive to light.   Neurological:      Mental Status: He is alert.      Motor: Motor strength is normal.     Coordination: Coordination is intact.      Deep Tendon Reflexes:      Reflex Scores:       Tricep reflexes are 2+ on the right side and 2+ on the left side.       Bicep reflexes are 2+ on the right side and 2+ on the left side.       Brachioradialis reflexes are 2+ on the right side and 2+ on the left side.       Patellar reflexes are  2+ on the right side and 2+ on the left side.       Achilles reflexes are 2+ on the right side and 2+ on the left side.      Neurological Exam  Mental Status  Alert. Oriented to person, place and time.    Cranial Nerves  CN II: Visual acuity is normal. Visual fields full to confrontation.  CN III, IV, VI: Extraocular movements intact bilaterally. Normal lids and orbits bilaterally. Pupils equal round and reactive to light bilaterally.  CN V: Facial sensation is normal.  CN VII: Full and symmetric facial movement.  CN IX, X: Palate elevates symmetrically. Normal gag reflex.  CN XI: Shoulder shrug strength is normal.  CN XII: Tongue midline without atrophy or fasciculations.    Motor   Strength is 5/5 throughout all four extremities.    Sensory  Sensation is intact to light touch, pinprick, vibration and proprioception in all four extremities.    Reflexes                                            Right                      Left  Brachioradialis                    2+                         2+  Biceps                                 2+                         2+  Triceps                                2+                         2+  Finger flex                           2+                         2+  Hamstring                            2+                         2+  Patellar                                2+                         2+  Achilles                                2+                         2+    Coordination    Finger-to-nose, rapid alternating movements and heel-to-shin normal bilaterally without dysmetria.    Gait  Normal casual, toe, heel and tandem gait.          Assessment & Plan   Independent Review of Radiographic Studies:      I personally reviewed the images from the following studies.    I again reviewed his plain films, myelogram, and CT scan which were done about 6 months ago.  This does show large anterior osteophytes all the way from C3 down to C6.  There is some flattening of the cord with  foraminal stenosis at C5-6 and C6-7.  The other levels all look okay internally.    Medical Decision Making:      I again reminded him that the surgery has a decent chance of improving his pain but there is no chance that we will get rid of it.  We will also try to remove as much of the anterior osteophytes as we can.  I told the patient about the surgery which is called an anterior cervical discectomy.  I explained that there is an 80% chance of getting rid of the arm pain and any other arm symptoms.  I also explained that the patient would still have neck pain.  Initially this will be quite severe however it will improve with healing from the surgery.  There is also a risk of infection, bleeding, paralysis, and anesthetic risk.  There is a risk of damage to the soft tissues of the neck such as the esophagus, carotids, and trachea.  All of these risks are about 2 or 3%.  There is about a 5% chance of nonunion or failure of the instrumentation.  There is also a about a 5% chance of hoarseness and trouble swallowing do to damage to the recurrent laryngeal nerve.  We discussed the postoperative hospital and home course as well.  The patient does ask to proceed.    He will need to be scheduled for a:  Cervical 5 to cervical 6 and cervical 6 to cervical 7 anterior cervical discectomy, fusion and instrumentation removal of anterior osteophytes     Diagnoses and all orders for this visit:    1. Cervical spinal stenosis (Primary)      Return for 2-3 week post op.

## 2024-10-29 ENCOUNTER — TELEPHONE (OUTPATIENT)
Dept: NEUROSURGERY | Facility: CLINIC | Age: 59
End: 2024-10-29
Payer: COMMERCIAL

## 2024-10-30 ENCOUNTER — PRE-ADMISSION TESTING (OUTPATIENT)
Dept: PREADMISSION TESTING | Facility: HOSPITAL | Age: 59
End: 2024-10-30
Payer: COMMERCIAL

## 2024-10-30 VITALS
RESPIRATION RATE: 18 BRPM | SYSTOLIC BLOOD PRESSURE: 160 MMHG | HEIGHT: 70 IN | HEART RATE: 82 BPM | WEIGHT: 187 LBS | DIASTOLIC BLOOD PRESSURE: 78 MMHG | BODY MASS INDEX: 26.77 KG/M2 | OXYGEN SATURATION: 98 % | TEMPERATURE: 98 F

## 2024-10-30 LAB
QT INTERVAL: 423 MS
QTC INTERVAL: 382 MS

## 2024-10-30 PROCEDURE — 93005 ELECTROCARDIOGRAM TRACING: CPT

## 2024-10-30 RX ORDER — TADALAFIL 5 MG/1
5 TABLET ORAL 2 TIMES DAILY
COMMUNITY

## 2024-10-30 RX ORDER — ACETAMINOPHEN 500 MG
500 TABLET ORAL EVERY 6 HOURS PRN
COMMUNITY

## 2024-10-30 RX ORDER — CHOLECALCIFEROL (VITAMIN D3) 25 MCG
1000 TABLET ORAL DAILY
COMMUNITY

## 2024-10-30 RX ORDER — IBUPROFEN 200 MG
200 TABLET ORAL EVERY 6 HOURS PRN
COMMUNITY

## 2024-10-30 NOTE — DISCHARGE INSTRUCTIONS
Take the following medications the morning of surgery:       CARVEDILOL    If you are on prescription narcotic pain medication to control your pain you may also take that medication the morning of surgery.      General Instructions:     Do not eat solid food after midnight the night before surgery.  Clear liquids day of surgery are allowed but must be stopped at least two hours before your hospital arrival time.       Allowed clear liquids      Water, sodas, and tea or coffee with no cream or milk added.       12 to 20 ounces of a clear liquid that contains carbohydrates is recommended.  If non-diabetic, have Gatorade or Powerade.  If diabetic, have G2 or Powerade Zero.     Do not have liquids red in color.  Do not consume chicken, beef, pork or vegetable broth or bouillon cubes of any variety as they are not considered clear liquids and are not allowed.      Infants may have breast milk up to four hours before surgery.  Infants drinking formula may drink formula up to six hours before surgery.   Patients who avoid smoking, chewing tobacco and alcohol for 4 weeks prior to surgery have a reduced risk of post-operative complications.  Quit smoking as many days before surgery as you can.  Do not smoke, use chewing tobacco or drink alcohol the day of surgery.   If applicable bring your C-PAP/ BI-PAP machine in with you to preop day of surgery.  Bring any papers given to you in the doctor’s office.  Wear clean comfortable clothes.  Do not wear contact lenses, false eyelashes or make-up.  Bring a case for your glasses.   Bring crutches or walker if applicable.  Remove all piercings.  Leave jewelry and any other valuables at home.  Hair extensions with metal clips must be removed prior to surgery.  The Pre-Admission Testing nurse will instruct you to bring medications if unable to obtain an accurate list in Pre-Admission Testing.            Preventing a Surgical Site Infection:  For 2 to 3 days before surgery, avoid  shaving with a razor because the razor can irritate skin and make it easier to develop an infection.    Any areas of open skin can increase the risk of a post-operative wound infection by allowing bacteria to enter and travel throughout the body.  Notify your surgeon if you have any skin wounds / rashes even if it is not near the expected surgical site.  The area will need assessed to determine if surgery should be delayed until it is healed.  The night prior to surgery shower using a fresh bar of anti-bacterial soap (such as Dial) and clean washcloth.  Sleep in a clean bed with clean clothing.  Do not allow pets to sleep with you.  Shower on the morning of surgery using a fresh bar of anti-bacterial soap (such as Dial) and clean washcloth.  Dry with a clean towel and dress in clean clothing.  Ask your surgeon if you will be receiving antibiotics prior to surgery.  Make sure you, your family, and all healthcare providers clean their hands with soap and water or an alcohol based hand  before caring for you or your wound.    Day of surgery:  Your arrival time is approximately two hours before your scheduled surgery time.  Please note if you have an early arrival time the surgery doors do not open before 5:00 AM.  Upon arrival, a Pre-op nurse and Anesthesiologist will review your health history, obtain vital signs, and answer questions you may have.  The only belongings needed at this time will be a list of your home medications and if applicable your C-PAP/BI-PAP machine.  A Pre-op nurse will start an IV and you may receive medication in preparation for surgery, including something to help you relax.     Please be aware that surgery does come with discomfort.  We want to make every effort to control your discomfort so please discuss any uncontrolled symptoms with your nurse.   Your doctor will most likely have prescribed pain medications.      If you are going home after surgery you will receive  individualized written care instructions before being discharged.  A responsible adult must drive you to and from the hospital on the day of your surgery and ideally stay with you through the night.   .  Discharge prescriptions can be filled by the hospital pharmacy during regular pharmacy hours.  If you are having surgery late in the day/evening your prescription may be e-prescribed to your pharmacy.  Please verify your pharmacy hours or chose a 24 hour pharmacy to avoid not having access to your prescription because your pharmacy has closed for the day.    If you are staying overnight following surgery, you will be transported to your hospital room following the recovery period.  [facility] has all private rooms.    If you have any questions please call Pre-Admission Testing at [phone].  Deductibles and co-payments are collected on the day of service. Please be prepared to pay the required co-pay, deductible or deposit on the day of service as defined by your plan.    Call your surgeon immediately if you experience any of the following symptoms:  Sore Throat  Shortness of Breath or difficulty breathing  Cough  Chills  Body soreness or muscle pain  Headache  Fever  New loss of taste or smell  Do not arrive for your surgery ill.  Your procedure will need to be rescheduled to another time.  You will need to call your physician before the day of surgery to avoid any unnecessary exposure to hospital staff as well as other patients.

## 2024-11-01 DIAGNOSIS — M48.02 CERVICAL SPINAL STENOSIS: Primary | ICD-10-CM

## 2024-11-04 ENCOUNTER — OFFICE VISIT (OUTPATIENT)
Dept: CARDIOLOGY | Facility: CLINIC | Age: 59
End: 2024-11-04
Payer: COMMERCIAL

## 2024-11-04 VITALS
BODY MASS INDEX: 26.83 KG/M2 | HEIGHT: 70 IN | HEART RATE: 50 BPM | DIASTOLIC BLOOD PRESSURE: 82 MMHG | OXYGEN SATURATION: 98 % | WEIGHT: 187.4 LBS | SYSTOLIC BLOOD PRESSURE: 130 MMHG

## 2024-11-04 DIAGNOSIS — I25.10 CORONARY ARTERY DISEASE INVOLVING NATIVE CORONARY ARTERY OF NATIVE HEART WITHOUT ANGINA PECTORIS: Primary | ICD-10-CM

## 2024-11-04 DIAGNOSIS — Z95.5: ICD-10-CM

## 2024-11-04 DIAGNOSIS — E78.2 MIXED HYPERLIPIDEMIA: ICD-10-CM

## 2024-11-04 DIAGNOSIS — I44.4 LAFB (LEFT ANTERIOR FASCICULAR BLOCK): ICD-10-CM

## 2024-11-04 DIAGNOSIS — R00.2 PALPITATIONS: ICD-10-CM

## 2024-11-04 DIAGNOSIS — Z01.810 PREOP CARDIOVASCULAR EXAM: ICD-10-CM

## 2024-11-04 DIAGNOSIS — Z95.5 S/P PRIMARY ANGIOPLASTY WITH CORONARY STENT: ICD-10-CM

## 2024-11-04 PROCEDURE — 93000 ELECTROCARDIOGRAM COMPLETE: CPT | Performed by: STUDENT IN AN ORGANIZED HEALTH CARE EDUCATION/TRAINING PROGRAM

## 2024-11-04 PROCEDURE — 99204 OFFICE O/P NEW MOD 45 MIN: CPT | Performed by: STUDENT IN AN ORGANIZED HEALTH CARE EDUCATION/TRAINING PROGRAM

## 2024-11-04 RX ORDER — CREATINE MONOHYDRATE 100 %
POWDER (GRAM) MISCELLANEOUS
COMMUNITY

## 2024-11-04 NOTE — PROGRESS NOTES
[facility]    Subjective:     Encounter Date:11/04/24      Patient ID: Rajesh Springer is a 59 y.o. male.    Chief Complaint:   Chief Complaint   Patient presents with    New Patient     Surgical clearance      History of Present Illness    Rajesh Springer is a 59 y.o. gentleman who presents for further evaluation of preop evaluation before a planned cervical spinal surgery.  He has a past medical history of coronary artery disease but has not been seen since 2016.  He previously followed with Dr. Alvarez.  He follows routinely with his PCP.    He ultimately saw providers at [place], Dr. Moffett, he underwent evaluation for recurrent atypical chest pain.  He had a left heart catheterization April 2015 where he had a stent placed in the first OM.  10% left main disease, 40% LAD, 70% first diagonal, 50 to 60% mid circumflex and 90% proximal OM1.  20% RCA.    He subsequently has received stents to his circumflex, as well as diagonal branch.    2.0 x 23 vision BMS to D1, 2.25 x 18 Xience YOLANDA to OM 2, 3.0 x 18 Xience YOLANDA to OM1    The stress test at the time that prompted the cath showed a moderate area of ischemia in the anterior/anteroseptal region.  He did have some chest pain which is likely reflective of GERD.    He had recurrent chest pain that occurred in 2017 at [place].  Repeat stress test at that time was normal.    He does not have any chest pain currently.  He otherwise feels well.  He does have some cervical spinal disease and is awaiting surgery for that.  He works as a , and when he is ambulating around houses, climbing up ladders, he does not have any exertional symptoms.  No chest pain.  No significant dyspnea.  He feels well.    He did have a recent viral URI-like episode.  He received some steroids.  He reports after he received the steroids, as well as taking some nasal decongestants, he would have some palpitations where he felt his heart fluttering race.  He  "had no accompanying symptoms outside of the fluttering.  He reports he had a heart rate detected during pulse ox which was \"all over the place.\"  He does have an Apple Watch but he is unsure if it has got the arrhythmia detection or not.  There is no mention of A-fib.  He states he feels well.  He has not had any return of any palpitations over the last few weeks    Previous Cardiac Testing:  Per above.    Nuclear perfusion stress test June 2017 at [facility].    . Normal Lexiscan stress Cardiolite examination.   2. No evidence of ischemia.   3. No evidence of infarction.   4. Gated wall motion study showed normal left ventricular wall motion and   systolic wall thickening in all left ventricular segments, with a   calculated post-stress left ventricular ejection fraction of 53%.   5. Negative pharmacologic stress ECG for ischemia with Lexiscan.     The following portions of the patient's history were reviewed and updated as appropriate: allergies, current medications, past family history, past medical history, past social history, past surgical history and problem list.    Past Medical History:   Diagnosis Date    Abnormal ECG [id number]    Alcohol abuse     PT DENIED HISTORY OF ALCOHOL ABUSE    Anxiety     CAD (coronary artery disease)     Cervical stenosis of spine     Chest pain     Enlarged prostate     GERD (gastroesophageal reflux disease)     Hyperlipidemia     Hypertension     Sleep apnea     CPAP    Slow to wake up after anesthesia        Past Surgical History:   Procedure Laterality Date    COLONOSCOPY      CORONARY STENT PLACEMENT  2015    proximal om-1    HIP ARTHROPLASTY Left     KNEE ARTHROSCOPY Right     TONSILLECTOMY             ECG 12 Lead    Date/Time: 11/4/2024 11:21 AM  Performed by: John Lopez MD    Authorized by: John Lopez MD  Comparison: compared with previous ECG from 10/30/2024  Similar to previous ECG  Rhythm: sinus rhythm  Rate: normal  Conduction: left anterior fascicular " "block  T Waves: T waves normal  QRS axis: left  Other: no other findings    Clinical impression: abnormal EKG      The ECG is not appreciably different from an ECG obtained in 2016.       Objective:     Vitals:    11/04/24 1100   BP: 130/82   BP Location: Right arm   Patient Position: Sitting   Cuff Size: Adult   Pulse: 50   SpO2: 98%   Weight: 85 kg (187 lb 6.4 oz)   Height: 177.8 cm (70\")         Constitutional:       Appearance: Healthy appearance. Not in distress.   Neck:      Vascular: JVD normal.   Pulmonary:      Effort: Pulmonary effort is normal.      Breath sounds: Normal breath sounds.   Cardiovascular:      PMI at left midclavicular line. Normal rate. Regular rhythm. Normal S2.       Murmurs: There is no murmur.   Pulses:     Intact distal pulses.   Edema:     Peripheral edema absent.   Skin:     General: Skin is warm and dry.   Neurological:      General: No focal deficit present.      Mental Status: Alert, oriented to person, place, and time and oriented to person, place and time.   Psychiatric:         Mood and Affect: Mood and affect normal.         Lab Review:       BUN   Date Value Ref Range Status   05/11/2022 18 8 - 26 mg/dL Final     Creatinine   Date Value Ref Range Status   05/11/2022 0.85 0.73 - 1.18 mg/dL Final     Potassium   Date Value Ref Range Status   05/11/2022 4.6 3.5 - 5.1 mmol/L Final     ALT (SGPT)   Date Value Ref Range Status   05/11/2022 22 10 - 50 U/L Final     AST (SGOT)   Date Value Ref Range Status   05/11/2022 35 10 - 50 U/L Final         Performed        Assessment:          Diagnosis Plan   1. Coronary artery disease involving native coronary artery of native heart without angina pectoris  ECG 12 Lead      2. Presence of bare metal stent in coronary artery        3. S/P primary angioplasty with coronary stent        4. Mixed hyperlipidemia        5. Preop cardiovascular exam        6. LAFB (left anterior fascicular block)        7. Palpitations  Holter Monitor - 72 Hour " Up To 15 Days             Plan:         Coronary artery disease: Status post PCI to OM, diagonal 2015.  Free of angina.  Stress testing nonischemic in 2017.  ECG today is unchanged from ECGs dating back to 2018.  Cervical spinal disease, planned anterior cervical discectomy  He is able to complete 4 METS of exertion without functional limitations.  He does not meet guideline recommendations for additional cardiac testing prior to his planned cervical spinal surgery.  He is low-moderate risk for preoperative MACE given his prior history of ischemic heart disease.  This risk is not modifiable.  He remains on lipid-lowering therapy, and antihypertensive management.  I discussed with Dr. Delatorre, the patient will resume aspirin 81 mg daily perioperatively  It is okay to withhold the patient's clopidogrel for now, and resume whenever safe from postsurgical perspective.  Will discuss with patient in 6 months regarding the need for prolonged DAPT.  He does have a 2.0 mm stent in a diagonal branch  Continue antihypertensive management and carvedilol  Hyperlipidemia: Continue Lipitor 40.  LDL is 90, slightly suboptimal but has been better in the past.  The patient had only recently resumed his lipid-lowering therapy, will reassess in 6-month  Palpitations: Occurred in the setting of anticholinergics due to recent URI, as well as steroid use.  Suspect that this might have been sinus tachycardia, however unable to rule out more significant arrhythmia.  He is currently in sinus rhythm and has not had any spells over the last several days to weeks.  I would recommend the patient be monitored on a telemetry unit postoperatively.  I have recommended a 2-week Zio patch to be obtained after surgery, possibly this can be fitted while he is still hospitalized if more convenient for the patient.  For now, these short-lived, lone episodes likely do not have any bearing on increased surgical risk but patient will need to be monitored per  above.  Left anterior fascicular block/abnormal ECG.  Stable.  No changes from previous       RTC 6 months.    John Lopez MD  [facility]  11/04/24  11:17 EST       Current Outpatient Medications:     acetaminophen (TYLENOL) 500 MG tablet, Take 1 tablet by mouth Every 6 (Six) Hours As Needed for Mild Pain., Disp: , Rfl:     ascorbic acid (VITAMIN C) 1000 MG tablet, Take 1 tablet by mouth Daily., Disp: , Rfl:     aspirin 81 MG tablet, Take 1 tablet by mouth Daily. PT HOLDING FOR SURGERY, Disp: , Rfl:     atorvastatin (LIPITOR) 40 MG tablet, Take 1 tablet by mouth Every Night., Disp: , Rfl:     Capsicum, Cayenne, (CAYENNE PO), Take  by mouth Daily., Disp: , Rfl:     carvedilol (COREG) 3.125 MG tablet, Take 1 tablet by mouth 2 (Two) Times a Day With Meals., Disp: , Rfl:     cholecalciferol (Vitamin D) 25 MCG (1000 UT) tablet, Take 1 tablet by mouth Daily., Disp: , Rfl:     clopidogrel (PLAVIX) 75 MG tablet, Take 1 tablet by mouth Daily. PT HOLDING FOR SURGERY, Disp: , Rfl:     Coenzyme Q10 10 MG capsule, Take 10 mg by mouth Daily. PT HOLDING FOR SURGERY, Disp: , Rfl:     Creatine Monohydrate powder, Use., Disp: , Rfl:     Glucos-Chond-Hyal Ac-Ca Fructo (MOVE FREE JOINT HEALTH ADVANCE PO), Take  by mouth Daily., Disp: , Rfl:     ibuprofen (ADVIL,MOTRIN) 200 MG tablet, Take 1 tablet by mouth Every 6 (Six) Hours As Needed for Mild Pain. PT HOLDING FOR SURGERY, Disp: , Rfl:     Magnesium Citrate 200 MG tablet, Take  by mouth Daily., Disp: , Rfl:     Misc Natural Products (BEET ROOT PO), Take  by mouth Daily., Disp: , Rfl:     multivitamin with minerals (MULTIVITAMIN ADULT PO), Take 1 tablet by mouth Daily. PT HOLDING FOR SURGERY, Disp: , Rfl:     NON FORMULARY, Take 1 tablet by mouth Daily. Vitamin D3 + K 12 125-100 MG cap, Disp: , Rfl:     tadalafil (Cialis) 5 MG tablet, Take 1 tablet by mouth 2 (Two) Times a Day. TAKES FOR PROSTATE, Disp: , Rfl:     Turmeric 5-1000 MG capsule, Take  by mouth Daily.,  Disp: , Rfl:          Return in about 6 months (around 5/4/2025).      Part of this note may be an electronic transcription/translation of spoken language to printed text using the Dragon Dictation System.

## 2024-11-04 NOTE — TELEPHONE ENCOUNTER
S/w patient and informed per Dr. Delatorre that he is cleared to have surgery and that he can stop the Plavix and continue his aspirin

## 2024-11-06 ENCOUNTER — HOSPITAL ENCOUNTER (OUTPATIENT)
Facility: HOSPITAL | Age: 59
Discharge: HOME | End: 2024-11-07
Attending: NEUROLOGICAL SURGERY | Admitting: NEUROLOGICAL SURGERY
Payer: COMMERCIAL

## 2024-11-06 ENCOUNTER — ANESTHESIA (OUTPATIENT)
Dept: PERIOP | Facility: HOSPITAL | Age: 59
End: 2024-11-06
Payer: COMMERCIAL

## 2024-11-06 ENCOUNTER — APPOINTMENT (OUTPATIENT)
Dept: GENERAL RADIOLOGY | Facility: HOSPITAL | Age: 59
End: 2024-11-06
Payer: COMMERCIAL

## 2024-11-06 ENCOUNTER — ANESTHESIA EVENT (OUTPATIENT)
Dept: PERIOP | Facility: HOSPITAL | Age: 59
End: 2024-11-06
Payer: COMMERCIAL

## 2024-11-06 DIAGNOSIS — I48.0 PAROXYSMAL ATRIAL FIBRILLATION: ICD-10-CM

## 2024-11-06 DIAGNOSIS — M47.12 CERVICAL SPONDYLOSIS WITH MYELOPATHY: Primary | ICD-10-CM

## 2024-11-06 DIAGNOSIS — M48.02 CERVICAL SPINAL STENOSIS: ICD-10-CM

## 2024-11-06 LAB
QT INTERVAL: 363 MS
QTC INTERVAL: 501 MS

## 2024-11-06 PROCEDURE — 22853 INSJ BIOMECHANICAL DEVICE: CPT | Performed by: SPECIALIST/TECHNOLOGIST, OTHER

## 2024-11-06 PROCEDURE — 25010000002 ONDANSETRON PER 1 MG: Performed by: REGISTERED NURSE

## 2024-11-06 PROCEDURE — 76000 FLUOROSCOPY <1 HR PHYS/QHP: CPT

## 2024-11-06 PROCEDURE — 93005 ELECTROCARDIOGRAM TRACING: CPT | Performed by: ANESTHESIOLOGY

## 2024-11-06 PROCEDURE — 25010000002 DEXAMETHASONE SODIUM PHOSPHATE 20 MG/5ML SOLUTION: Performed by: REGISTERED NURSE

## 2024-11-06 PROCEDURE — 22552 ARTHRD ANT NTRBD CERVICAL EA: CPT | Performed by: SPECIALIST/TECHNOLOGIST, OTHER

## 2024-11-06 PROCEDURE — 93010 ELECTROCARDIOGRAM REPORT: CPT | Performed by: INTERNAL MEDICINE

## 2024-11-06 PROCEDURE — C1713 ANCHOR/SCREW BN/BN,TIS/BN: HCPCS | Performed by: NEUROLOGICAL SURGERY

## 2024-11-06 PROCEDURE — 22551 ARTHRD ANT NTRBDY CERVICAL: CPT | Performed by: NEUROLOGICAL SURGERY

## 2024-11-06 PROCEDURE — 25010000002 VANCOMYCIN 1 G RECONSTITUTED SOLUTION 1 EACH VIAL: Performed by: NEUROLOGICAL SURGERY

## 2024-11-06 PROCEDURE — 22853 INSJ BIOMECHANICAL DEVICE: CPT | Performed by: NEUROLOGICAL SURGERY

## 2024-11-06 PROCEDURE — 22552 ARTHRD ANT NTRBD CERVICAL EA: CPT | Performed by: NEUROLOGICAL SURGERY

## 2024-11-06 PROCEDURE — 25010000002 GLYCOPYRROLATE 1 MG/5ML SOLUTION: Performed by: REGISTERED NURSE

## 2024-11-06 PROCEDURE — 25010000002 HYDROMORPHONE PER 4 MG: Performed by: REGISTERED NURSE

## 2024-11-06 PROCEDURE — 25010000002 SODIUM CHLORIDE 0.9 % WITH KCL 20 MEQ 20-0.9 MEQ/L-% SOLUTION: Performed by: NEUROLOGICAL SURGERY

## 2024-11-06 PROCEDURE — 25010000002 FENTANYL CITRATE (PF) 50 MCG/ML SOLUTION: Performed by: REGISTERED NURSE

## 2024-11-06 PROCEDURE — 22845 INSERT SPINE FIXATION DEVICE: CPT | Performed by: SPECIALIST/TECHNOLOGIST, OTHER

## 2024-11-06 PROCEDURE — 25010000002 LIDOCAINE 2% SOLUTION: Performed by: REGISTERED NURSE

## 2024-11-06 PROCEDURE — 25010000002 SUGAMMADEX 200 MG/2ML SOLUTION: Performed by: REGISTERED NURSE

## 2024-11-06 PROCEDURE — 22551 ARTHRD ANT NTRBDY CERVICAL: CPT | Performed by: SPECIALIST/TECHNOLOGIST, OTHER

## 2024-11-06 PROCEDURE — 25010000002 CEFAZOLIN PER 500 MG: Performed by: NEUROLOGICAL SURGERY

## 2024-11-06 PROCEDURE — L0120 CERV FLEX N/ADJ FOAM PRE OTS: HCPCS | Performed by: NEUROLOGICAL SURGERY

## 2024-11-06 PROCEDURE — 25010000002 PROPOFOL 200 MG/20ML EMULSION: Performed by: REGISTERED NURSE

## 2024-11-06 PROCEDURE — 22845 INSERT SPINE FIXATION DEVICE: CPT | Performed by: NEUROLOGICAL SURGERY

## 2024-11-06 PROCEDURE — 25810000003 LACTATED RINGERS PER 1000 ML: Performed by: ANESTHESIOLOGY

## 2024-11-06 RX ORDER — LABETALOL HYDROCHLORIDE 5 MG/ML
5 INJECTION, SOLUTION INTRAVENOUS
Status: DISCONTINUED | OUTPATIENT
Start: 2024-11-06 | End: 2024-11-06 | Stop reason: HOSPADM

## 2024-11-06 RX ORDER — ROCURONIUM BROMIDE 10 MG/ML
INJECTION, SOLUTION INTRAVENOUS AS NEEDED
Status: DISCONTINUED | OUTPATIENT
Start: 2024-11-06 | End: 2024-11-06 | Stop reason: SURG

## 2024-11-06 RX ORDER — HYDROCODONE BITARTRATE AND ACETAMINOPHEN 5; 325 MG/1; MG/1
1 TABLET ORAL ONCE AS NEEDED
Status: DISCONTINUED | OUTPATIENT
Start: 2024-11-06 | End: 2024-11-06 | Stop reason: HOSPADM

## 2024-11-06 RX ORDER — DEXAMETHASONE SODIUM PHOSPHATE 4 MG/ML
INJECTION, SOLUTION INTRA-ARTICULAR; INTRALESIONAL; INTRAMUSCULAR; INTRAVENOUS; SOFT TISSUE AS NEEDED
Status: DISCONTINUED | OUTPATIENT
Start: 2024-11-06 | End: 2024-11-06 | Stop reason: SURG

## 2024-11-06 RX ORDER — MAGNESIUM HYDROXIDE 1200 MG/15ML
LIQUID ORAL AS NEEDED
Status: DISCONTINUED | OUTPATIENT
Start: 2024-11-06 | End: 2024-11-06 | Stop reason: HOSPADM

## 2024-11-06 RX ORDER — ASPIRIN 81 MG/1
81 TABLET ORAL DAILY
Status: DISCONTINUED | OUTPATIENT
Start: 2024-11-07 | End: 2024-11-07 | Stop reason: HOSPADM

## 2024-11-06 RX ORDER — LIDOCAINE HYDROCHLORIDE 20 MG/ML
INJECTION, SOLUTION INFILTRATION; PERINEURAL AS NEEDED
Status: DISCONTINUED | OUTPATIENT
Start: 2024-11-06 | End: 2024-11-06 | Stop reason: SURG

## 2024-11-06 RX ORDER — DROPERIDOL 2.5 MG/ML
0.62 INJECTION, SOLUTION INTRAMUSCULAR; INTRAVENOUS
Status: DISCONTINUED | OUTPATIENT
Start: 2024-11-06 | End: 2024-11-06 | Stop reason: HOSPADM

## 2024-11-06 RX ORDER — FENTANYL CITRATE 50 UG/ML
INJECTION, SOLUTION INTRAMUSCULAR; INTRAVENOUS AS NEEDED
Status: DISCONTINUED | OUTPATIENT
Start: 2024-11-06 | End: 2024-11-06 | Stop reason: SURG

## 2024-11-06 RX ORDER — SODIUM CHLORIDE 0.9 % (FLUSH) 0.9 %
10 SYRINGE (ML) INJECTION EVERY 12 HOURS SCHEDULED
Status: DISCONTINUED | OUTPATIENT
Start: 2024-11-06 | End: 2024-11-07 | Stop reason: HOSPADM

## 2024-11-06 RX ORDER — PROMETHAZINE HYDROCHLORIDE 25 MG/1
25 SUPPOSITORY RECTAL ONCE AS NEEDED
Status: DISCONTINUED | OUTPATIENT
Start: 2024-11-06 | End: 2024-11-06 | Stop reason: HOSPADM

## 2024-11-06 RX ORDER — ONDANSETRON 4 MG/1
4 TABLET, ORALLY DISINTEGRATING ORAL EVERY 6 HOURS PRN
Status: DISCONTINUED | OUTPATIENT
Start: 2024-11-06 | End: 2024-11-07 | Stop reason: HOSPADM

## 2024-11-06 RX ORDER — BISACODYL 5 MG/1
5 TABLET, DELAYED RELEASE ORAL DAILY PRN
Status: DISCONTINUED | OUTPATIENT
Start: 2024-11-06 | End: 2024-11-07 | Stop reason: HOSPADM

## 2024-11-06 RX ORDER — ONDANSETRON 2 MG/ML
INJECTION INTRAMUSCULAR; INTRAVENOUS AS NEEDED
Status: DISCONTINUED | OUTPATIENT
Start: 2024-11-06 | End: 2024-11-06 | Stop reason: SURG

## 2024-11-06 RX ORDER — ACETAMINOPHEN 650 MG/1
650 SUPPOSITORY RECTAL EVERY 4 HOURS PRN
Status: DISCONTINUED | OUTPATIENT
Start: 2024-11-06 | End: 2024-11-07 | Stop reason: HOSPADM

## 2024-11-06 RX ORDER — SODIUM CHLORIDE 0.9 % (FLUSH) 0.9 %
3-10 SYRINGE (ML) INJECTION AS NEEDED
Status: DISCONTINUED | OUTPATIENT
Start: 2024-11-06 | End: 2024-11-06 | Stop reason: HOSPADM

## 2024-11-06 RX ORDER — ACETAMINOPHEN 325 MG/1
650 TABLET ORAL EVERY 4 HOURS PRN
Status: DISCONTINUED | OUTPATIENT
Start: 2024-11-06 | End: 2024-11-07 | Stop reason: HOSPADM

## 2024-11-06 RX ORDER — EPHEDRINE SULFATE 50 MG/ML
5 INJECTION, SOLUTION INTRAVENOUS ONCE AS NEEDED
Status: DISCONTINUED | OUTPATIENT
Start: 2024-11-06 | End: 2024-11-06 | Stop reason: HOSPADM

## 2024-11-06 RX ORDER — PROMETHAZINE HYDROCHLORIDE 25 MG/1
25 TABLET ORAL ONCE AS NEEDED
Status: DISCONTINUED | OUTPATIENT
Start: 2024-11-06 | End: 2024-11-06 | Stop reason: HOSPADM

## 2024-11-06 RX ORDER — HYDROMORPHONE HYDROCHLORIDE 1 MG/ML
0.5 INJECTION, SOLUTION INTRAMUSCULAR; INTRAVENOUS; SUBCUTANEOUS
Status: DISCONTINUED | OUTPATIENT
Start: 2024-11-06 | End: 2024-11-06 | Stop reason: HOSPADM

## 2024-11-06 RX ORDER — SODIUM CHLORIDE 0.9 % (FLUSH) 0.9 %
10 SYRINGE (ML) INJECTION AS NEEDED
Status: DISCONTINUED | OUTPATIENT
Start: 2024-11-06 | End: 2024-11-07 | Stop reason: HOSPADM

## 2024-11-06 RX ORDER — CARVEDILOL 3.12 MG/1
3.12 TABLET ORAL 2 TIMES DAILY WITH MEALS
Status: DISCONTINUED | OUTPATIENT
Start: 2024-11-06 | End: 2024-11-06

## 2024-11-06 RX ORDER — AMOXICILLIN 250 MG
2 CAPSULE ORAL 2 TIMES DAILY PRN
Status: DISCONTINUED | OUTPATIENT
Start: 2024-11-06 | End: 2024-11-07 | Stop reason: HOSPADM

## 2024-11-06 RX ORDER — LIDOCAINE HYDROCHLORIDE 40 MG/ML
SOLUTION TOPICAL AS NEEDED
Status: DISCONTINUED | OUTPATIENT
Start: 2024-11-06 | End: 2024-11-06 | Stop reason: SURG

## 2024-11-06 RX ORDER — OXYCODONE AND ACETAMINOPHEN 7.5; 325 MG/1; MG/1
1 TABLET ORAL EVERY 4 HOURS PRN
Status: DISCONTINUED | OUTPATIENT
Start: 2024-11-06 | End: 2024-11-06 | Stop reason: HOSPADM

## 2024-11-06 RX ORDER — BISACODYL 10 MG
10 SUPPOSITORY, RECTAL RECTAL DAILY PRN
Status: DISCONTINUED | OUTPATIENT
Start: 2024-11-06 | End: 2024-11-07 | Stop reason: HOSPADM

## 2024-11-06 RX ORDER — ACETAMINOPHEN 160 MG/5ML
650 SOLUTION ORAL EVERY 4 HOURS PRN
Status: DISCONTINUED | OUTPATIENT
Start: 2024-11-06 | End: 2024-11-07 | Stop reason: HOSPADM

## 2024-11-06 RX ORDER — FENTANYL CITRATE 50 UG/ML
50 INJECTION, SOLUTION INTRAMUSCULAR; INTRAVENOUS
Status: DISCONTINUED | OUTPATIENT
Start: 2024-11-06 | End: 2024-11-06 | Stop reason: HOSPADM

## 2024-11-06 RX ORDER — METOPROLOL TARTRATE 25 MG/1
25 TABLET, FILM COATED ORAL EVERY 12 HOURS SCHEDULED
Status: DISCONTINUED | OUTPATIENT
Start: 2024-11-06 | End: 2024-11-07

## 2024-11-06 RX ORDER — FLUMAZENIL 0.1 MG/ML
0.2 INJECTION INTRAVENOUS AS NEEDED
Status: DISCONTINUED | OUTPATIENT
Start: 2024-11-06 | End: 2024-11-06 | Stop reason: HOSPADM

## 2024-11-06 RX ORDER — NALOXONE HCL 0.4 MG/ML
0.2 VIAL (ML) INJECTION AS NEEDED
Status: DISCONTINUED | OUTPATIENT
Start: 2024-11-06 | End: 2024-11-06 | Stop reason: HOSPADM

## 2024-11-06 RX ORDER — PROPOFOL 10 MG/ML
INJECTION, EMULSION INTRAVENOUS AS NEEDED
Status: DISCONTINUED | OUTPATIENT
Start: 2024-11-06 | End: 2024-11-06 | Stop reason: SURG

## 2024-11-06 RX ORDER — POLYETHYLENE GLYCOL 3350 17 G/17G
17 POWDER, FOR SOLUTION ORAL DAILY PRN
Status: DISCONTINUED | OUTPATIENT
Start: 2024-11-06 | End: 2024-11-07 | Stop reason: HOSPADM

## 2024-11-06 RX ORDER — ONDANSETRON 2 MG/ML
4 INJECTION INTRAMUSCULAR; INTRAVENOUS EVERY 6 HOURS PRN
Status: DISCONTINUED | OUTPATIENT
Start: 2024-11-06 | End: 2024-11-07 | Stop reason: HOSPADM

## 2024-11-06 RX ORDER — HYDROMORPHONE HYDROCHLORIDE 1 MG/ML
0.25 INJECTION, SOLUTION INTRAMUSCULAR; INTRAVENOUS; SUBCUTANEOUS EVERY 4 HOURS PRN
Status: DISCONTINUED | OUTPATIENT
Start: 2024-11-06 | End: 2024-11-07 | Stop reason: HOSPADM

## 2024-11-06 RX ORDER — LIDOCAINE HYDROCHLORIDE 10 MG/ML
0.5 INJECTION, SOLUTION INFILTRATION; PERINEURAL ONCE AS NEEDED
Status: DISCONTINUED | OUTPATIENT
Start: 2024-11-06 | End: 2024-11-06 | Stop reason: HOSPADM

## 2024-11-06 RX ORDER — ONDANSETRON 2 MG/ML
4 INJECTION INTRAMUSCULAR; INTRAVENOUS ONCE AS NEEDED
Status: DISCONTINUED | OUTPATIENT
Start: 2024-11-06 | End: 2024-11-06 | Stop reason: HOSPADM

## 2024-11-06 RX ORDER — SODIUM CHLORIDE AND POTASSIUM CHLORIDE 150; 900 MG/100ML; MG/100ML
100 INJECTION, SOLUTION INTRAVENOUS CONTINUOUS
Status: DISCONTINUED | OUTPATIENT
Start: 2024-11-06 | End: 2024-11-07 | Stop reason: HOSPADM

## 2024-11-06 RX ORDER — SODIUM CHLORIDE, SODIUM LACTATE, POTASSIUM CHLORIDE, CALCIUM CHLORIDE 600; 310; 30; 20 MG/100ML; MG/100ML; MG/100ML; MG/100ML
9 INJECTION, SOLUTION INTRAVENOUS CONTINUOUS
Status: ACTIVE | OUTPATIENT
Start: 2024-11-06 | End: 2024-11-07

## 2024-11-06 RX ORDER — DIPHENHYDRAMINE HYDROCHLORIDE 50 MG/ML
12.5 INJECTION INTRAMUSCULAR; INTRAVENOUS
Status: DISCONTINUED | OUTPATIENT
Start: 2024-11-06 | End: 2024-11-06 | Stop reason: HOSPADM

## 2024-11-06 RX ORDER — HYDRALAZINE HYDROCHLORIDE 20 MG/ML
5 INJECTION INTRAMUSCULAR; INTRAVENOUS
Status: DISCONTINUED | OUTPATIENT
Start: 2024-11-06 | End: 2024-11-06 | Stop reason: HOSPADM

## 2024-11-06 RX ORDER — SODIUM CHLORIDE 0.9 % (FLUSH) 0.9 %
3 SYRINGE (ML) INJECTION EVERY 12 HOURS SCHEDULED
Status: DISCONTINUED | OUTPATIENT
Start: 2024-11-06 | End: 2024-11-06 | Stop reason: HOSPADM

## 2024-11-06 RX ORDER — IPRATROPIUM BROMIDE AND ALBUTEROL SULFATE 2.5; .5 MG/3ML; MG/3ML
3 SOLUTION RESPIRATORY (INHALATION) ONCE AS NEEDED
Status: DISCONTINUED | OUTPATIENT
Start: 2024-11-06 | End: 2024-11-06 | Stop reason: HOSPADM

## 2024-11-06 RX ORDER — FAMOTIDINE 10 MG/ML
20 INJECTION, SOLUTION INTRAVENOUS ONCE
Status: COMPLETED | OUTPATIENT
Start: 2024-11-06 | End: 2024-11-06

## 2024-11-06 RX ORDER — NALOXONE HCL 0.4 MG/ML
0.4 VIAL (ML) INJECTION
Status: DISCONTINUED | OUTPATIENT
Start: 2024-11-06 | End: 2024-11-07 | Stop reason: HOSPADM

## 2024-11-06 RX ORDER — GLYCOPYRROLATE 0.2 MG/ML
INJECTION INTRAMUSCULAR; INTRAVENOUS AS NEEDED
Status: DISCONTINUED | OUTPATIENT
Start: 2024-11-06 | End: 2024-11-06 | Stop reason: SURG

## 2024-11-06 RX ORDER — ATORVASTATIN CALCIUM 20 MG/1
40 TABLET, FILM COATED ORAL NIGHTLY
Status: DISCONTINUED | OUTPATIENT
Start: 2024-11-06 | End: 2024-11-07 | Stop reason: HOSPADM

## 2024-11-06 RX ORDER — PROMETHAZINE HYDROCHLORIDE 12.5 MG/1
12.5 TABLET ORAL EVERY 6 HOURS PRN
Status: DISCONTINUED | OUTPATIENT
Start: 2024-11-06 | End: 2024-11-07 | Stop reason: HOSPADM

## 2024-11-06 RX ORDER — HYDROCODONE BITARTRATE AND ACETAMINOPHEN 5; 325 MG/1; MG/1
1 TABLET ORAL EVERY 4 HOURS PRN
Status: DISCONTINUED | OUTPATIENT
Start: 2024-11-06 | End: 2024-11-07 | Stop reason: HOSPADM

## 2024-11-06 RX ADMIN — METOPROLOL TARTRATE 5 MG: 1 INJECTION, SOLUTION INTRAVENOUS at 17:30

## 2024-11-06 RX ADMIN — HYDROMORPHONE HYDROCHLORIDE 0.5 MG: 1 INJECTION, SOLUTION INTRAMUSCULAR; INTRAVENOUS; SUBCUTANEOUS at 16:11

## 2024-11-06 RX ADMIN — FENTANYL CITRATE 50 MCG: 50 INJECTION, SOLUTION INTRAMUSCULAR; INTRAVENOUS at 16:50

## 2024-11-06 RX ADMIN — POTASSIUM CHLORIDE AND SODIUM CHLORIDE 100 ML/HR: 900; 150 INJECTION, SOLUTION INTRAVENOUS at 20:05

## 2024-11-06 RX ADMIN — ROCURONIUM BROMIDE 10 MG: 10 INJECTION, SOLUTION INTRAVENOUS at 14:53

## 2024-11-06 RX ADMIN — FAMOTIDINE 20 MG: 10 INJECTION INTRAVENOUS at 10:13

## 2024-11-06 RX ADMIN — SUGAMMADEX 200 MG: 100 INJECTION, SOLUTION INTRAVENOUS at 15:23

## 2024-11-06 RX ADMIN — PROPOFOL 200 MG: 10 INJECTION, EMULSION INTRAVENOUS at 12:38

## 2024-11-06 RX ADMIN — Medication 10 MG: at 14:26

## 2024-11-06 RX ADMIN — Medication 10 MG: at 13:31

## 2024-11-06 RX ADMIN — SODIUM CHLORIDE 2000 MG: 900 INJECTION INTRAVENOUS at 12:24

## 2024-11-06 RX ADMIN — SODIUM CHLORIDE 2000 MG: 900 INJECTION INTRAVENOUS at 20:06

## 2024-11-06 RX ADMIN — LIDOCAINE HYDROCHLORIDE 40 MG: 20 INJECTION, SOLUTION INFILTRATION; PERINEURAL at 12:38

## 2024-11-06 RX ADMIN — FENTANYL CITRATE 50 MCG: 50 INJECTION, SOLUTION INTRAMUSCULAR; INTRAVENOUS at 16:10

## 2024-11-06 RX ADMIN — SODIUM CHLORIDE, SODIUM LACTATE, POTASSIUM CHLORIDE, CALCIUM CHLORIDE 9 ML/HR: 20; 30; 600; 310 INJECTION, SOLUTION INTRAVENOUS at 09:40

## 2024-11-06 RX ADMIN — GLYCOPYRROLATE 0.2 MCG: 0.2 INJECTION, SOLUTION INTRAMUSCULAR; INTRAVENOUS at 13:01

## 2024-11-06 RX ADMIN — GLYCOPYRROLATE 0.2 MCG: 0.2 INJECTION, SOLUTION INTRAMUSCULAR; INTRAVENOUS at 12:56

## 2024-11-06 RX ADMIN — METOPROLOL TARTRATE 25 MG: 25 TABLET, FILM COATED ORAL at 20:06

## 2024-11-06 RX ADMIN — ROCURONIUM BROMIDE 10 MG: 10 INJECTION, SOLUTION INTRAVENOUS at 13:19

## 2024-11-06 RX ADMIN — ROCURONIUM BROMIDE 50 MG: 10 INJECTION, SOLUTION INTRAVENOUS at 12:40

## 2024-11-06 RX ADMIN — LIDOCAINE HYDROCHLORIDE 1 EACH: 40 SOLUTION TOPICAL at 12:41

## 2024-11-06 RX ADMIN — ROCURONIUM BROMIDE 10 MG: 10 INJECTION, SOLUTION INTRAVENOUS at 13:53

## 2024-11-06 RX ADMIN — FENTANYL CITRATE 50 MCG: 50 INJECTION, SOLUTION INTRAMUSCULAR; INTRAVENOUS at 12:38

## 2024-11-06 RX ADMIN — ROCURONIUM BROMIDE 10 MG: 10 INJECTION, SOLUTION INTRAVENOUS at 14:23

## 2024-11-06 RX ADMIN — DEXAMETHASONE SODIUM PHOSPHATE 6 MG: 4 INJECTION, SOLUTION INTRAMUSCULAR; INTRAVENOUS at 12:41

## 2024-11-06 RX ADMIN — Medication 10 ML: at 20:08

## 2024-11-06 RX ADMIN — HYDROCODONE BITARTRATE AND ACETAMINOPHEN 1 TABLET: 5; 325 TABLET ORAL at 19:51

## 2024-11-06 RX ADMIN — GLYCOPYRROLATE 0.2 MCG: 0.2 INJECTION, SOLUTION INTRAMUSCULAR; INTRAVENOUS at 12:53

## 2024-11-06 RX ADMIN — HYDROMORPHONE HYDROCHLORIDE 0.5 MG: 1 INJECTION, SOLUTION INTRAMUSCULAR; INTRAVENOUS; SUBCUTANEOUS at 16:31

## 2024-11-06 RX ADMIN — ONDANSETRON 4 MG: 2 INJECTION INTRAMUSCULAR; INTRAVENOUS at 12:41

## 2024-11-06 RX ADMIN — ATORVASTATIN CALCIUM 40 MG: 20 TABLET, FILM COATED ORAL at 20:06

## 2024-11-06 RX ADMIN — Medication 30 MG: at 12:38

## 2024-11-06 NOTE — OP NOTE
Preoperative diagnosis: Cervical myelopathy secondary to cervical stenosis C5-6 and C6-7 with large anterior cervical spurs on the cervical spine    Postoperative diagnosis: Same    Procedure performed: Anterior cervical discectomy, fusion and instrumentation C5-6 and C6-7 with removal of some element of anterior cervical spurs at C4.    Spinal Surgery Levels Completed:2 Levels     Surgeon: Corey Delatorre M.D.    Assistant: Katja Leo CFA who was instrumental in helping with hemostasis, visualization of neural structures, and retraction of neural structures.  Her skilled assistance was necessary for the success of this case    Indications for the procedure: This patient was having severe symptoms in the neck and arms.   Imaging showed significant neural compression in the cervical spine at C5-6 and C6-7.    Operative summary: After induction of general anesthesia with intravenous agents patient was intubated and placed on the operating table in the supine position.  The head was hyperextended on donut roll and a roll of sheets was placed under the shoulders.  The shoulders were taped down being careful not to stretch the brachial plexus too much.  All peripheral points of entrapment and pressure were padded and secured.   The neck was prepped with Chloraprep.    An incision was made in the right lower portion of the neck.  This was carried down to the platysma.  The platysma was opened in the direction of its fibers.  Dissection was carried down through the middle cervical fascia to the anterior aspect of the cervical spine.  At this point I proceeded to remove soft tissue from the front of the cervical spine and then use the Midas Bolivar drill to be in the drill down the anterior cervical spurs.  I was able to accomplish this pretty well at C4, C5, C6 and C7 but did get all the way up to C3 I was concerned about further trauma to the esophagus and so I elected not to be as aggressive up higher.    A needle was  placed in the first available disc space.  This did prove to be C5-6.  Attention was turned to that level on the next 1 down.  The longus colli muscles were elevated off both sides of the anterior cervical spine and then the Cloward retractors were locked under the longus colli muscles and used to hold the esophagus, trachea, and recurrent laryngeal bundle medially and the carotid bundle and its contents laterally.  The disc space at the C6-7 level was incised and disc material was removed with the 0 and 3-0 up-biting and straight curettes. The pituitary was also used to remove disc material.  Following this the posterior lip of the vertebral body was drilled off in combination with the uncovertebral joints on each side.    The posterior longitudinal ligament was then opened and removed from foramen to foramen completely decompressing the spinal cord and the nerve roots.  Once the nerve roots were visualized in each neural foramen and found to be completely decompressed bleeding was controlled with a combination of the bipolar cautery, FloSeal, and thrombin and Gelfoam.  Once the bleeding was stopped the disc space was sized and a 9 mm Titan Milena cage was placed into the disc space.  It was filled first with Bayamon putty. The compression at this level was primarily due to stenosis.    Following this attention was turned to the C5-6 level.  At that level the same thing was done.  The disc material was removed, the posterior lip of the vertebral body was removed as well as the uncovertebral joints.  The posterior longitudinal ligament was then opened and removed. Bleeding was again controlled with the bipolar cautery FloSeal, and thrombin and Gelfoam.  Finally another titan Milena cage was placed at this level along with Bayamon putty.  This cage measured 12 mm.  At this level the compression was mostly due to stenosis.    A 47 mm Atlantis plate was then attached to the front of the cervical spine using 6 14 mm  screws.    The retractors were removed and final x-rays taken. Bleeding was controlled with the bipolar cautery and a drain was placed in the anterior cervical space and tunneled subcutaneously. It was then sewn into place and the incision was closed in layers, dressed and the patient was taken to the recovery room in stable condition.  Sponge instrument and needle counts were correct at the end of the procedure.

## 2024-11-06 NOTE — ANESTHESIA POSTPROCEDURE EVALUATION
Patient: Rajesh Springer    Procedure Summary       Date: 11/06/24 Room / Location: [facility] OR [address] [place] [address] MAIN OR    Anesthesia Start: 1230 Anesthesia Stop: 1550    Procedure: Cervical 5 to cervical 6 and cervical 6 to cervical 7 anterior cervical discectomy, fusion and instrumentation (Spine Cervical) Diagnosis:       Cervical spinal stenosis      (Cervical spinal stenosis [M48.02])    Surgeons: Corey Delatorre MD Provider: Irene Willingham MD    Anesthesia Type: general ASA Status: 3            Anesthesia Type: general    Vitals  Vitals Value Taken Time   /73 11/06/24 1630   Temp 36.6 °C (97.9 °F) 11/06/24 1547   Pulse 65 11/06/24 1631   Resp 16 11/06/24 1630   SpO2 99 % 11/06/24 1631   Vitals shown include unfiled device data.        Anesthesia Post Evaluation

## 2024-11-06 NOTE — BRIEF OP NOTE
CERVICAL DISCECTOMY ANTERIOR FUSION WITH INSTRUMENTATION  Progress Note    Rajesh Isabelby  11/6/2024    Pre-op Diagnosis:   Cervical spinal stenosis [M48.02]       Post-Op Diagnosis Codes:     * Cervical spinal stenosis [M48.02]    Procedure/CPT® Codes:        Procedure(s):  Cervical 5 to cervical 6 and cervical 6 to cervical 7 anterior cervical discectomy, fusion and instrumentation              Surgeon(s):  Corey Delatorre MD    Anesthesia: General    Staff:   Circulator: Laura Srinivasan RN; Noris Alvarado RN; Isabel Velez RN; Prashanth Ramos RN  Scrub Person: Elsa Moser  Assistant: Katja Leo CSA  Assistant: Katja Leo CSA      Estimated Blood Loss: 100ml    Urine Voided: * No values recorded between 11/6/2024 12:27 PM and 11/6/2024  3:24 PM *    Specimens:                None          Drains: * No LDAs found *    Findings: Severe stenosis with large osteophytic spurs anteriorly        Complications: None    Corey Delatorre MD     Date: 11/6/2024  Time: 15:33 EST

## 2024-11-06 NOTE — PERIOPERATIVE NURSING NOTE
Dr. Alvarez returned call advised of patient status, stated ok would put orders in and as long as he was monitored he could go to [facility].

## 2024-11-06 NOTE — ANESTHESIA PROCEDURE NOTES
Airway  Urgency: elective    Date/Time: 11/6/2024 12:42 PM  Airway not difficult    General Information and Staff    Patient location during procedure: OR  Anesthesiologist: Irene Willingham MD  CRNA/CAA: Stephane Jackson CRNA    Indications and Patient Condition  Indications for airway management: airway protection    Preoxygenated: yes  MILS not maintained throughout  Mask difficulty assessment: 1 - vent by mask    Final Airway Details  Final airway type: endotracheal airway      Successful airway: ETT  Cuffed: yes   Successful intubation technique: direct laryngoscopy  Facilitating devices/methods: anterior pressure/BURP  Endotracheal tube insertion site: oral  Blade: Herman  Blade size: 4  ETT size (mm): 7.5  Cormack-Lehane Classification: grade IIb - view of arytenoids or posterior of glottis only  Placement verified by: chest auscultation and capnometry   Cuff volume (mL): 10  Measured from: teeth  ETT/EBT  to teeth (cm): 22  Number of attempts at approach: 1  Assessment: lips, teeth, and gum same as pre-op and atraumatic intubation

## 2024-11-06 NOTE — PERIOPERATIVE NURSING NOTE
Dr. Delacruz at bedside as monitor states A-fib, and patient c/o burning in chest that is noticeable, order received for 12 lead.

## 2024-11-06 NOTE — ANESTHESIA PREPROCEDURE EVALUATION
Anesthesia Evaluation     history of anesthetic complications:                Airway   Mallampati: III  TM distance: <3 FB  Neck ROM: limited  Anterior and Possible difficult intubation  Dental          Pulmonary    (+) ,sleep apnea  Cardiovascular     Patient on routine beta blocker    (+) hypertension less than 2 medications, CAD, hyperlipidemia      Neuro/Psych  (+) psychiatric history Anxiety and Depression  GI/Hepatic/Renal/Endo    (+) GERD    Musculoskeletal     Abdominal    Substance History   (+) alcohol use     OB/GYN          Other                      Anesthesia Plan    ASA 3     general     (Pt has prostate hypertrophy and failed to urinate after surgery and went home with catheter.)  intravenous induction     Anesthetic plan, risks, benefits, and alternatives have been provided, discussed and informed consent has been obtained with: patient.      CODE STATUS:

## 2024-11-07 ENCOUNTER — APPOINTMENT (OUTPATIENT)
Dept: GENERAL RADIOLOGY | Facility: HOSPITAL | Age: 59
End: 2024-11-07
Payer: COMMERCIAL

## 2024-11-07 ENCOUNTER — APPOINTMENT (OUTPATIENT)
Dept: CARDIOLOGY | Facility: HOSPITAL | Age: 59
End: 2024-11-07
Payer: COMMERCIAL

## 2024-11-07 VITALS
HEIGHT: 70 IN | RESPIRATION RATE: 16 BRPM | BODY MASS INDEX: 26.63 KG/M2 | HEART RATE: 47 BPM | SYSTOLIC BLOOD PRESSURE: 126 MMHG | DIASTOLIC BLOOD PRESSURE: 66 MMHG | WEIGHT: 186 LBS | TEMPERATURE: 97.4 F | OXYGEN SATURATION: 98 %

## 2024-11-07 LAB
AORTIC DIMENSIONLESS INDEX: 0.6 (DI)
ASCENDING AORTA: 3.5 CM
BASOPHILS # BLD AUTO: 0.03 10*3/MM3 (ref 0–0.2)
BASOPHILS NFR BLD AUTO: 0.4 % (ref 0–1.5)
BH CV ECHO MEAS - ACS: 2.46 CM
BH CV ECHO MEAS - AO MAX PG: 9.4 MMHG
BH CV ECHO MEAS - AO MEAN PG: 4.9 MMHG
BH CV ECHO MEAS - AO ROOT DIAM: 3.9 CM
BH CV ECHO MEAS - AO V2 MAX: 152.9 CM/SEC
BH CV ECHO MEAS - AO V2 VTI: 36.1 CM
BH CV ECHO MEAS - AVA(I,D): 2.6 CM2
BH CV ECHO MEAS - EDV(CUBED): 122.8 ML
BH CV ECHO MEAS - EDV(MOD-SP2): 105 ML
BH CV ECHO MEAS - EDV(MOD-SP4): 71 ML
BH CV ECHO MEAS - EF(MOD-BP): 65.2 %
BH CV ECHO MEAS - EF(MOD-SP2): 65.7 %
BH CV ECHO MEAS - EF(MOD-SP4): 64.8 %
BH CV ECHO MEAS - ESV(CUBED): 36.4 ML
BH CV ECHO MEAS - ESV(MOD-SP2): 36 ML
BH CV ECHO MEAS - ESV(MOD-SP4): 25 ML
BH CV ECHO MEAS - FS: 33.3 %
BH CV ECHO MEAS - IVS/LVPW: 1.11 CM
BH CV ECHO MEAS - IVSD: 1.23 CM
BH CV ECHO MEAS - LA DIMENSION: 3.2 CM
BH CV ECHO MEAS - LAT PEAK E' VEL: 12.1 CM/SEC
BH CV ECHO MEAS - LV DIASTOLIC VOL/BSA (35-75): 35.1 CM2
BH CV ECHO MEAS - LV MASS(C)D: 223.1 GRAMS
BH CV ECHO MEAS - LV MAX PG: 3.9 MMHG
BH CV ECHO MEAS - LV MEAN PG: 1.69 MMHG
BH CV ECHO MEAS - LV SYSTOLIC VOL/BSA (12-30): 12.4 CM2
BH CV ECHO MEAS - LV V1 MAX: 98.5 CM/SEC
BH CV ECHO MEAS - LV V1 VTI: 23.6 CM
BH CV ECHO MEAS - LVIDD: 5 CM
BH CV ECHO MEAS - LVIDS: 3.3 CM
BH CV ECHO MEAS - LVOT AREA: 3.9 CM2
BH CV ECHO MEAS - LVOT DIAM: 2.24 CM
BH CV ECHO MEAS - LVPWD: 1.11 CM
BH CV ECHO MEAS - MED PEAK E' VEL: 8.8 CM/SEC
BH CV ECHO MEAS - MV A DUR: 0.16 SEC
BH CV ECHO MEAS - MV A MAX VEL: 54.4 CM/SEC
BH CV ECHO MEAS - MV DEC SLOPE: 292.4 CM/SEC2
BH CV ECHO MEAS - MV DEC TIME: 282 SEC
BH CV ECHO MEAS - MV E MAX VEL: 81.8 CM/SEC
BH CV ECHO MEAS - MV E/A: 1.5
BH CV ECHO MEAS - MV MAX PG: 3.5 MMHG
BH CV ECHO MEAS - MV MEAN PG: 1.2 MMHG
BH CV ECHO MEAS - MV P1/2T: 98.3 MSEC
BH CV ECHO MEAS - MV V2 VTI: 41.6 CM
BH CV ECHO MEAS - MVA(P1/2T): 2.24 CM2
BH CV ECHO MEAS - MVA(VTI): 2.24 CM2
BH CV ECHO MEAS - PA ACC TIME: 0.2 SEC
BH CV ECHO MEAS - PA V2 MAX: 89.6 CM/SEC
BH CV ECHO MEAS - PULM A REVS DUR: 0.13 SEC
BH CV ECHO MEAS - PULM A REVS VEL: 29.4 CM/SEC
BH CV ECHO MEAS - PULM DIAS VEL: 54.6 CM/SEC
BH CV ECHO MEAS - PULM S/D: 1.34
BH CV ECHO MEAS - PULM SYS VEL: 73 CM/SEC
BH CV ECHO MEAS - QP/QS: 1.24
BH CV ECHO MEAS - RAP SYSTOLE: 3 MMHG
BH CV ECHO MEAS - RV MAX PG: 1.06 MMHG
BH CV ECHO MEAS - RV V1 MAX: 51.4 CM/SEC
BH CV ECHO MEAS - RV V1 VTI: 16.6 CM
BH CV ECHO MEAS - RVDD: 2.5 CM
BH CV ECHO MEAS - RVOT DIAM: 3 CM
BH CV ECHO MEAS - RVSP: 29.6 MMHG
BH CV ECHO MEAS - SUP REN AO DIAM: 2.4 CM
BH CV ECHO MEAS - SV(LVOT): 93.3 ML
BH CV ECHO MEAS - SV(MOD-SP2): 69 ML
BH CV ECHO MEAS - SV(MOD-SP4): 46 ML
BH CV ECHO MEAS - SV(RVOT): 116.1 ML
BH CV ECHO MEAS - SVI(LVOT): 46.1 ML/M2
BH CV ECHO MEAS - SVI(MOD-SP2): 34.1 ML/M2
BH CV ECHO MEAS - SVI(MOD-SP4): 22.7 ML/M2
BH CV ECHO MEAS - TAPSE (>1.6): 2.7 CM
BH CV ECHO MEAS - TR MAX PG: 26.6 MMHG
BH CV ECHO MEAS - TR MAX VEL: 257.9 CM/SEC
BH CV ECHO MEASUREMENTS AVERAGE E/E' RATIO: 7.83
BH CV XLRA - RV BASE: 3.7 CM
BH CV XLRA - RV LENGTH: 6.8 CM
BH CV XLRA - RV MID: 3.1 CM
BH CV XLRA - TDI S': 14.6 CM/SEC
DEPRECATED RDW RBC AUTO: 40.1 FL (ref 37–54)
EOSINOPHIL # BLD AUTO: 0 10*3/MM3 (ref 0–0.4)
EOSINOPHIL NFR BLD AUTO: 0 % (ref 0.3–6.2)
ERYTHROCYTE [DISTWIDTH] IN BLOOD BY AUTOMATED COUNT: 11.9 % (ref 12.3–15.4)
HCT VFR BLD AUTO: 41.8 % (ref 37.5–51)
HGB BLD-MCNC: 14.4 G/DL (ref 13–17.7)
IMM GRANULOCYTES # BLD AUTO: 0.02 10*3/MM3 (ref 0–0.05)
IMM GRANULOCYTES NFR BLD AUTO: 0.3 % (ref 0–0.5)
LEFT ATRIUM VOLUME INDEX: 24.8 ML/M2
LYMPHOCYTES # BLD AUTO: 0.92 10*3/MM3 (ref 0.7–3.1)
LYMPHOCYTES NFR BLD AUTO: 12.3 % (ref 19.6–45.3)
MCH RBC QN AUTO: 31.7 PG (ref 26.6–33)
MCHC RBC AUTO-ENTMCNC: 34.4 G/DL (ref 31.5–35.7)
MCV RBC AUTO: 92.1 FL (ref 79–97)
MONOCYTES # BLD AUTO: 0.87 10*3/MM3 (ref 0.1–0.9)
MONOCYTES NFR BLD AUTO: 11.6 % (ref 5–12)
NEUTROPHILS NFR BLD AUTO: 5.67 10*3/MM3 (ref 1.7–7)
NEUTROPHILS NFR BLD AUTO: 75.4 % (ref 42.7–76)
NRBC BLD AUTO-RTO: 0 /100 WBC (ref 0–0.2)
PLATELET # BLD AUTO: 175 10*3/MM3 (ref 140–450)
PMV BLD AUTO: 10 FL (ref 6–12)
QT INTERVAL: 465 MS
QTC INTERVAL: 380 MS
RBC # BLD AUTO: 4.54 10*6/MM3 (ref 4.14–5.8)
SINUS: 3.9 CM
STJ: 3.2 CM
WBC NRBC COR # BLD AUTO: 7.51 10*3/MM3 (ref 3.4–10.8)

## 2024-11-07 PROCEDURE — 72040 X-RAY EXAM NECK SPINE 2-3 VW: CPT

## 2024-11-07 PROCEDURE — 99254 IP/OBS CNSLTJ NEW/EST MOD 60: CPT | Performed by: STUDENT IN AN ORGANIZED HEALTH CARE EDUCATION/TRAINING PROGRAM

## 2024-11-07 PROCEDURE — 93306 TTE W/DOPPLER COMPLETE: CPT

## 2024-11-07 PROCEDURE — 85025 COMPLETE CBC W/AUTO DIFF WBC: CPT | Performed by: NEUROLOGICAL SURGERY

## 2024-11-07 PROCEDURE — 93010 ELECTROCARDIOGRAM REPORT: CPT | Performed by: INTERNAL MEDICINE

## 2024-11-07 PROCEDURE — 93246 EXT ECG>7D<15D RECORDING: CPT

## 2024-11-07 PROCEDURE — 93306 TTE W/DOPPLER COMPLETE: CPT | Performed by: INTERNAL MEDICINE

## 2024-11-07 PROCEDURE — 93005 ELECTROCARDIOGRAM TRACING: CPT | Performed by: STUDENT IN AN ORGANIZED HEALTH CARE EDUCATION/TRAINING PROGRAM

## 2024-11-07 PROCEDURE — 99024 POSTOP FOLLOW-UP VISIT: CPT | Performed by: NURSE PRACTITIONER

## 2024-11-07 PROCEDURE — 25010000002 CEFAZOLIN PER 500 MG: Performed by: NEUROLOGICAL SURGERY

## 2024-11-07 RX ORDER — METHOCARBAMOL 750 MG/1
750 TABLET, FILM COATED ORAL 4 TIMES DAILY PRN
Qty: 40 TABLET | Refills: 0 | Status: SHIPPED | OUTPATIENT
Start: 2024-11-07

## 2024-11-07 RX ORDER — HYDROCODONE BITARTRATE AND ACETAMINOPHEN 5; 325 MG/1; MG/1
1 TABLET ORAL EVERY 4 HOURS PRN
Qty: 25 TABLET | Refills: 0 | Status: SHIPPED | OUTPATIENT
Start: 2024-11-07 | End: 2024-11-11 | Stop reason: SDUPTHER

## 2024-11-07 RX ADMIN — SODIUM CHLORIDE 2000 MG: 900 INJECTION INTRAVENOUS at 04:24

## 2024-11-07 RX ADMIN — HYDROCODONE BITARTRATE AND ACETAMINOPHEN 1 TABLET: 5; 325 TABLET ORAL at 13:06

## 2024-11-07 RX ADMIN — HYDROCODONE BITARTRATE AND ACETAMINOPHEN 1 TABLET: 5; 325 TABLET ORAL at 04:27

## 2024-11-07 RX ADMIN — HYDROCODONE BITARTRATE AND ACETAMINOPHEN 1 TABLET: 5; 325 TABLET ORAL at 09:04

## 2024-11-07 RX ADMIN — METOPROLOL TARTRATE 25 MG: 25 TABLET, FILM COATED ORAL at 09:04

## 2024-11-07 RX ADMIN — Medication 10 ML: at 09:06

## 2024-11-07 RX ADMIN — ASPIRIN 81 MG: 81 TABLET, COATED ORAL at 09:04

## 2024-11-07 RX ADMIN — SODIUM CHLORIDE 2000 MG: 900 INJECTION INTRAVENOUS at 11:52

## 2024-11-07 NOTE — PROGRESS NOTES
Continued Stay Note  [facility]     Patient Name: Rajesh Springer  MRN: [id number]  Today's Date: 11/7/2024    Admit Date: 11/6/2024        Discharge Plan       Row Name 11/07/24 1548       Plan    Final Discharge Disposition Code 01 - home or self-care    Final Note D/c home                   Discharge Codes    No documentation.                 Expected Discharge Date and Time       Expected Discharge Date Expected Discharge Time    Nov 7, 2024               Zenaida Gauthier RN

## 2024-11-07 NOTE — PROGRESS NOTES
Reviewed echo results showing normal left ventricular systolic function, normal diastolic function and no significant valve disease.  Patient is currently admitted

## 2024-11-07 NOTE — DISCHARGE INSTRUCTIONS
[facility]  [address], [address]  [place] [address]  Phone:  [phone]  Fax:  [phone]      Corey Delatorre M.D., F.A.C.S.    INSTRUCTION & CARE AFTER YOUR CERVICAL DISCECTOMY    Wear your neck collar at all times except when showering or shaving, being careful not to bend your head forward, backward, or to the side while the collar is off.    No lifting anything heavier than a coffee cup or paperback book.    No driving until you begin physical therapy in three to four weeks. We recommend that you limit riding in a car because of the risk of an accident. If you are a passenger, wear your seatbelt.    You may bend over or reach over your head as long as you don’t lift anything heavy.    Walk as much as possible.    Remove the bandage on the second day after surgery and leave the incision open to air. If you notice any redness, swelling or drainage, call the office. There are steri-strips across the incision. If these have not come off by the ninth or tenth day after surgery, peel them off gently.     You may shower five days after surgery. Don’t let the water beat directly on the incision. Gently pat the incision dry.     Call as soon as possible after leaving the hospital to schedule an appointment with the Physician Assistant or Nurse Practitioner if any appointment has not already been made. Physical Therapy will be arranged when you return for this visit. You may get rid of your collar after this visit.     Your prescription for pain medication may be refilled for only half the original amount prior to your return office visit. Due to changes in Federal Law in order to have this medication refilled you must contact the office four days prior to the due date and make arrangements to pick the prescription up in the office. This prescription refill cannot be called in to the pharmacy. Your prescription will be ready for pick-up the day the refill is due.     Don’t be alarmed if you  experience some of your pre-operative symptoms after going home. This is not uncommon and normally goes away in a few days but may last longer. Pain, aching and stiffness in the neck, across the shoulders and between the shoulder blades are very common. If you have any questions or concerns don’t hesitate to call the office.

## 2024-11-07 NOTE — DISCHARGE SUMMARY
Rajesh Springer  1965    Patient Care Team:  Holger Oliveira MD as PCP - General  Holger Oliveira MD as PCP - Family Medicine    Date of Admit: 11/6/2024    Date of Discharge:  11/7/2024    Discharge Diagnosis:  Cervical spinal stenosis    Cervical spondylosis with myelopathy      Procedures Performed  Procedure(s):  Cervical 5 to cervical 6 and cervical 6 to cervical 7 anterior cervical discectomy, fusion and instrumentation       Complications: None    Consultants:   Consults       Date and Time Order Name Status Description    11/6/2024  5:32 PM Inpatient Consult to Cardiology              Condition on Discharge: stable    Discharge disposition: home      Brief HPI: Patient evaluated in office for complaints neck pain of neck pain that went into both shoulders but more so on the left. Imaging revealed significant neural compression in the cervical spine at C5-6 and C6-7. RBAs of treatment were discussed including the above procedure. Patient consented to above procedure.    Hospital Course: Patient admitted for above procedure. The procedure itself was without complication. The patient was transferred to [facility] following recovery.  Patient developed burning chest pain while in recovery and was found to be in new onset A-fib.  He did convert back to normal sinus rhythm and was started on metoprolol 25 mg twice a day.  Cardiology also recommends Eliquis and stopping aspirin and Plavix.  Patient is okay to start Eliquis on Saturday, November 9, he will take his aspirin as scheduled tomorrow.  Patient otherwise had uncomplicated postop stay.  On day of discharge pain under control, swallowing without difficulty, tolerating diet and ambulating and voiding without difficulty.  Postop cervical spine x-ray and CBC all look good.  Anterior cervical incision is well-appearing.  He has follow-up appointment with Dr. Delatorre on 11/21/24.      Discharge Physical Exam:    Temp:  [97.2 °F (36.2 °C)-98.6 °F (37  °C)] 97.4 °F (36.3 °C)  Heart Rate:  [] 43  Resp:  [16] 16  BP: (109-155)/() 109/65    Current labs:  Lab Results (last 24 hours)       Procedure Component Value Units Date/Time    CBC & Differential [[id number]]  (Abnormal) Collected: 11/07/24 0343    Specimen: Blood Updated: 11/07/24 0455    Narrative:      The following orders were created for panel order CBC & Differential.  Procedure                               Abnormality         Status                     ---------                               -----------         ------                     CBC Auto Differential[[id number]]        Abnormal            Final result                 Please view results for these tests on the individual orders.    CBC Auto Differential [[id number]]  (Abnormal) Collected: 11/07/24 0343    Specimen: Blood Updated: 11/07/24 0455     WBC 7.51 10*3/mm3      RBC 4.54 10*6/mm3      Hemoglobin 14.4 g/dL      Hematocrit 41.8 %      MCV 92.1 fL      MCH 31.7 pg      MCHC 34.4 g/dL      RDW 11.9 %      RDW-SD 40.1 fl      MPV 10.0 fL      Platelets 175 10*3/mm3      Neutrophil % 75.4 %      Lymphocyte % 12.3 %      Monocyte % 11.6 %      Eosinophil % 0.0 %      Basophil % 0.4 %      Immature Grans % 0.3 %      Neutrophils, Absolute 5.67 10*3/mm3      Lymphocytes, Absolute 0.92 10*3/mm3      Monocytes, Absolute 0.87 10*3/mm3      Eosinophils, Absolute 0.00 10*3/mm3      Basophils, Absolute 0.03 10*3/mm3      Immature Grans, Absolute 0.02 10*3/mm3      nRBC 0.0 /100 WBC           Cervical Spine Xray:  Intact appearing anterior plate and screw fusion hardware at C5, C6, C7 with intervertebral disc spacers.  Normal alignment, no fracture.      General Appearance No acute distress   HEENT NC/AT;    Neurological Awake, Alert, and oriented x 3   Motor Strength normal bulk, tone normal to bilateral upper extremities   Sensory Intact to light touch bilateral upper extremities   Reflexes 2+ bilateral bicep, tricep and brachioradialis  reflexes   Gait and station Ambulating without assistance   Neck Supple, trachea midline, swallowing without difficulty  Anterior cervical incision is well-appearing, well-approximated with Steri-Strips in place.  There is no surrounding erythema, swelling or drainage.  GLORIA drain with small amount of bloody drainage.   Extremities Moves all extremities well, no edema, no cyanosis, no redness         Discharge Medications  ADI has been reviewed and narcotic consent is on file in the patient's chart.     Your medication list        START taking these medications        Instructions Last Dose Given Next Dose Due   HYDROcodone-acetaminophen 5-325 MG per tablet  Commonly known as: NORCO      Take 1 tablet by mouth Every 4 (Four) Hours As Needed for Moderate Pain.       methocarbamol 750 MG tablet  Commonly known as: ROBAXIN      Take 1 tablet by mouth 4 (Four) Times a Day As Needed for Muscle Spasms.              CONTINUE taking these medications        Instructions Last Dose Given Next Dose Due   acetaminophen 500 MG tablet  Commonly known as: TYLENOL      Take 1 tablet by mouth Every 6 (Six) Hours As Needed for Mild Pain.       ascorbic acid 1000 MG tablet  Commonly known as: VITAMIN C      Take 1 tablet by mouth Daily.       aspirin 81 MG tablet      Take 1 tablet by mouth Daily. PT HOLDING FOR SURGERY       atorvastatin 40 MG tablet  Commonly known as: LIPITOR      Take 1 tablet by mouth Every Night.       BEET ROOT PO      Take  by mouth Daily.       carvedilol 3.125 MG tablet  Commonly known as: COREG      Take 1 tablet by mouth 2 (Two) Times a Day With Meals.       CAYENNE PO      Take  by mouth Daily.       cholecalciferol 25 MCG (1000 UT) tablet  Commonly known as: VITAMIN D3      Take 1 tablet by mouth Daily.       Cialis 5 MG tablet  Generic drug: tadalafil      Take 1 tablet by mouth 2 (Two) Times a Day. TAKES FOR PROSTATE       clopidogrel 75 MG tablet  Commonly known as: PLAVIX      Take 1 tablet by mouth  Daily. PT HOLDING FOR SURGERY       Coenzyme Q10 10 MG capsule      Take 10 mg by mouth Daily. PT HOLDING FOR SURGERY       Creatine Monohydrate powder      Use.       Magnesium Citrate 200 MG tablet      Take  by mouth Daily.       MOVE FREE JOINT HEALTH ADVANCE PO      Take  by mouth Daily.       multivitamin with minerals tablet tablet      Take 1 tablet by mouth Daily. PT HOLDING FOR SURGERY       Turmeric 5-1000 MG capsule      Take  by mouth Daily.              STOP taking these medications      ibuprofen 200 MG tablet  Commonly known as: ADVIL,MOTRIN        NON FORMULARY                  Where to Get Your Medications        These medications were sent to [place] [id number]      Hours: Monday to Friday 7 AM to 6 PM, Saturday & Sunday 8 AM to 4:30 PM (Closed 12 PM to 12:30 PM) Phone: [phone]   HYDROcodone-acetaminophen 5-325 MG per tablet  methocarbamol 750 MG tablet         Discharge Diet:   Diet Instructions       Diet: Regular/House Diet; Thin (IDDSI 0)      Discharge Diet: Regular/House Diet    Fluid Consistency: Thin (IDDSI 0)          Diet Order   Procedures    Diet: Liquid; Clear Liquid; Fluid Consistency: Thin (IDDSI 0)       Activity at Discharge:   Activity Instructions       Discharge Activity      1) No driving until cleared by us and no longer taking narcotics.   2) Off work/school until cleared by us.  3) May shower but no submerging wound in tub, pool, etc.  4) Do not lift / push / pull more then 5 lbs.  5.) Wear soft collar or ice collar  6.) No overhead activity/ No bending/twisting/impact activity    Other Restrictions (Specify)      Pelvic Rest              Call for: questions or concerns    Follow-up Appointments  Future Appointments   Date Time Provider Department Center   11/14/2024  3:30 PM [facility]   11/20/2024  9:45 AM Melissa Powell APRN [facility]   5/23/2025  2:00 PM John Lopez MD  "[facility]      Follow-up Information       Holger Oliveira MD .    Specialty: Internal Medicine  Contact information:  [address]  [place] [address]  [phone]                           Additional Instructions for the Follow-ups that You Need to Schedule       Dressing Change Instructions   As directed      Keep dressing in place for 48 hours    Order Comments: Keep dressing in place for 48 hours         Notify Physician or Go To The ED For the Following Conditions   As directed      Including but not limited to fever >100.5, chills, wound concerns (redness, swelling, drainage), new symptoms of numbness, tingling, weakness; new or uncontrolled pain despite using prescribed medications    Order Comments: Including but not limited to fever >100.5, chills, wound concerns (redness, swelling, drainage), new symptoms of numbness, tingling, weakness; new or uncontrolled pain despite using prescribed medications                 Test Results Pending at Discharge     None    I discussed the discharge instructions with patient, nursing staff, consulting provider, and Dr Juan Ramon Powell, APRN  11/07/24  12:42 EST      \"Dictated utilizing Dragon dictation\".      "

## 2024-11-07 NOTE — CONSULTS
"    Patient Name: Rajesh Springer  :1965  59 y.o.    Date of Admission: 2024  Date of Consultation:  24  Encounter Provider: Jefferson Cao MD  Place of Service: [facility] CARDIOLOGY  Referring Provider: Holger Oliveira MD  Patient Care Team:  Holger Oliveira MD as PCP - General  Holger Oliveira MD as PCP - Family Medicine      Chief complaint: cervical spinal stenosis     History of Present Illness: Rajesh Springer is a 59 year old pt with a history of CAD who followed with Dr. Alvarez in the past.   Pt was seen by [facility] and evaluated for atypical chest pain and had a cardiac catheterization 2015.  He had a stent placed to the first OM, 10% left main disease, 40% LAD, 70% first diagonal, 50 to 60% mid circumflex and 90% proximal OM1, 20% RCA.  Patient also received stents to the circumflex, and diagonal branch. Stress test in  was nonischemic.     Patient was last seen in the office on 2024 by Dr. Lopez as a new patient for surgical clearance for cervical spine surgery.  Patient denied any chest pain and was feeling well.   Patient reported he is a  and when he ambulates around homes climbing up stairs and ladders he has no exertional symptoms.  He denies any significant dyspnea.  Patient reported he had a recent viral upper respiratory infection and after taking steroids and nasal decongestions he had some palpitation feelings where his heart was fluttering and racing.  Patient reported he did have an episode of his heart being \"all over the place \" when he was wearing a pulse ox.  Patient does have an Apple Watch but he is unsure if he had an arrhythmia detection or not and there was  no mention of any A-fib.  Patient denied any return of palpitations over the last few weeks.  It was felt he has moderate risk for preoperative MACE and risk is not modifiable.  It was recommended patient could be off " Plavix and resume whenever safe postoperatively.  It was recommended patient be monitored on telemetry unit postoperatively.  It is also recommended he have a 2-week Zio patch after surgery.    Patient presented to [facility] on 11/6/2024 for scheduled cervical spine surgery with Dr. Delatorre.     Postoperatively he developed atrial fibrillation.  Mildly tachycardic with heart rate in the low 100s.  He quickly converted to normal sinus rhythm.  He was started on metoprolol 25 mg twice daily.  Overnight his heart rate has been around 40.  He overall feels well but did note feeling a little dizzy this a.m. when he got up.  Denies any chest pain.      Past Medical History:   Diagnosis Date    Abnormal ECG [id number]    Alcohol abuse     PT DENIED HISTORY OF ALCOHOL ABUSE    Anxiety     CAD (coronary artery disease)     Cervical stenosis of spine     Chest pain     Enlarged prostate     GERD (gastroesophageal reflux disease)     Hyperlipidemia     Hypertension     Sleep apnea     CPAP    Slow to wake up after anesthesia        Past Surgical History:   Procedure Laterality Date    COLONOSCOPY      CORONARY STENT PLACEMENT  2015    proximal om-1    HIP ARTHROPLASTY Left     KNEE ARTHROSCOPY Right     TONSILLECTOMY           Prior to Admission medications    Medication Sig Start Date End Date Taking? Authorizing Provider   acetaminophen (TYLENOL) 500 MG tablet Take 1 tablet by mouth Every 6 (Six) Hours As Needed for Mild Pain.   Yes Elvia Herrera MD   ascorbic acid (VITAMIN C) 1000 MG tablet Take 1 tablet by mouth Daily.   Yes Elvia Herrera MD   aspirin 81 MG tablet Take 1 tablet by mouth Daily. PT HOLDING FOR SURGERY   Yes Elvia Herrera MD   atorvastatin (LIPITOR) 40 MG tablet Take 1 tablet by mouth Every Night.   Yes Elvia Herrera MD   Capsicum, Herminiaenne, (CAYENNE PO) Take  by mouth Daily.   Yes Elvia Herrera MD   carvedilol (COREG) 3.125 MG tablet Take 1 tablet by mouth 2  (Two) Times a Day With Meals.   Yes Elvia Herrera MD   cholecalciferol (Vitamin D) 25 MCG (1000 UT) tablet Take 1 tablet by mouth Daily.   Yes Elvia Herrera MD   clopidogrel (PLAVIX) 75 MG tablet Take 1 tablet by mouth Daily. PT HOLDING FOR SURGERY   Yes Elvia Herrera MD   Coenzyme Q10 10 MG capsule Take 10 mg by mouth Daily. PT HOLDING FOR SURGERY   Yes Elvia Herrera MD   Creatine Monohydrate powder Use.   Yes Elvia Herrera MD   Glucos-Chond-Hyal Ac-Ca Fructo (MOVE FREE JOINT HEALTH ADVANCE PO) Take  by mouth Daily.   Yes Elvia Herrera MD   ibuprofen (ADVIL,MOTRIN) 200 MG tablet Take 1 tablet by mouth Every 6 (Six) Hours As Needed for Mild Pain. PT HOLDING FOR SURGERY   Yes Elvia Herrera MD   Magnesium Citrate 200 MG tablet Take  by mouth Daily.   Yes Elvia Herrera MD   Misc Natural Products (BEET ROOT PO) Take  by mouth Daily.   Yes Elvia Herrera MD   multivitamin with minerals (MULTIVITAMIN ADULT PO) Take 1 tablet by mouth Daily. PT HOLDING FOR SURGERY   Yes Elvia Herrera MD   NON FORMULARY Take 1 tablet by mouth Daily. Vitamin D3 + K 12 125-100 MG cap   Yes Elvia Herrera MD   tadalafil (Cialis) 5 MG tablet Take 1 tablet by mouth 2 (Two) Times a Day. TAKES FOR PROSTATE   Yes Elvia Herrera MD   Turmeric 5-1000 MG capsule Take  by mouth Daily.   Yes Elvia Herrera MD       No Known Allergies    Social History     Socioeconomic History    Marital status:    Tobacco Use    Smoking status: Former     Current packs/day: 0.00     Average packs/day: 0.5 packs/day for 2.7 years (1.4 ttl pk-yrs)     Types: Cigarettes     Start date: 2022     Quit date: 2024     Years since quittin.1     Passive exposure: Past    Smokeless tobacco: Never    Tobacco comments:     Quit for 9 years   Vaping Use    Vaping status: Never Used   Substance and Sexual Activity    Alcohol use: Not Currently     Alcohol/week: 21.0  standard drinks of alcohol     Types: 21 Glasses of wine per week     Comment: 1 bottle wine per night    Drug use: Never    Sexual activity: Yes     Partners: Female       Family History   Problem Relation Age of Onset    Heart disease Father     Hypertension Father     Hyperlipidemia Father     Heart disease Brother     Malig Hyperthermia Neg Hx        REVIEW OF SYSTEMS:   All systems reviewed.  Pertinent positives identified in HPI.  All other systems are negative.      Objective:     Vitals:    11/06/24 2105 11/07/24 0151 11/07/24 0505 11/07/24 1037   BP: 118/71 113/62 109/61 109/65   BP Location:  Left arm Left arm Left arm   Patient Position:  Lying Lying Lying   Pulse: 79 72 54 (!) 43   Resp: 16 16 16 16   Temp:  97.2 °F (36.2 °C) 98.4 °F (36.9 °C) 97.4 °F (36.3 °C)   TempSrc:  Oral Oral Oral   SpO2: 99% 95% 98%    Weight:       Height:         Body mass index is 26.7 kg/m².    General Appearance:    Alert, cooperative, in no acute distress   Head:    Normocephalic, without obvious abnormality, atraumatic   Eyes:            Lids and lashes normal, conjunctivae and sclerae normal, no icterus, no pallor, corneas clear, PERRLA   Ears:    Ears appear intact with no abnormalities noted   Throat:   No oral lesions, no thrush, oral mucosa moist   Neck: In brace   Back:     No kyphosis present, no scoliosis present, no skin lesions, erythema or scars, no tenderness to percussion or palpation, range of motion normal   Lungs:     Clear to auscultation, respirations regular, even and unlabored    Heart:    Regular rhythm and n bradycardic, normal S1 and S2, no murmur, no gallop, no rub, no click   Chest Wall:    No abnormalities observed   Abdomen:     Normal bowel sounds, no masses, no organomegaly, soft, nontender, nondistended, no guarding, no rebound  tenderness   Extremities:   Moves all extremities well, no edema, no cyanosis, no redness   Pulses:   Pulses palpable and equal bilaterally. Normal radial, carotid,  "femoral, dorsalis pedis and posterior tibial pulses bilaterally. Normal abdominal aorta   Skin:  Psychiatric:   No bleeding, bruising or rash    Alert and oriented x 3, normal mood and affect   Lab Review:           Invalid input(s): \"LABALBU\", \"PROT\"      Results from last 7 days   Lab Units 11/07/24  0343   WBC 10*3/mm3 7.51   HEMOGLOBIN g/dL 14.4   HEMATOCRIT % 41.8   PLATELETS 10*3/mm3 175                                               I personally viewed and interpreted the patient's EKG/Telemetry data.        Assessment and Plan:   59-year-old gentleman with a history of coronary disease status post PCI to the  and diagonal branch in 2015 that presented to the hospital for cervical spinal surgery.  Recently established care with Dr. Lopez.  Denies any chest pain or shortness of breath but did have some palpitations.  Plan was to have post procedure Zio and follow-up.  Postoperatively he developed atrial fibrillation with a heart rate of 114.  Spontaneous converted with 5 mg of metoprolol.  He was then started on metoprolol 25 mg twice daily.  Heart rates have been low (sinus bradycardia in the low 40s) and he does have some dizziness so we will continue this medication.  He was very mildly tachycardic while in atrial fibrillation.  His [id number] is at least 2 given his coronary disease and hypertension.  I would recommend anticoagulation with Eliquis when able from a surgical standpoint.  His coronary disease is stable and feel we can hold aspirin and Plavix if initiating AC.  Will still light recommend 2-week Zio patch on discharge to quantify burden of atrial fibrillation.  He can then follow-up with his primary cardiologist.  TTE ordered to ensure normal cardiac function.    Paroxysmal atrial fibrillation  CAD status post PCI to OM and diagonal in 2015  Postoperative from anterior cervical discectomy  Hyperlipidemia  -Discharged on 2-week ZIO.  Will expedite follow-up  -Eliquis 5 mg twice daily when " able from a surgical standpoint.  Can discontinue aspirin when transitioning to elqiuis.   -discontinue metoprolol due to bradycardia  -TTE ordered but if discharging today this can be done as an outpatient.     Thank you for the consult.           Jefferson Cao MD  11/07/24  12:59 EST

## 2024-11-07 NOTE — PLAN OF CARE
Goal Outcome Evaluation:  Plan of Care Reviewed With: patient        Progress: improving  Outcome Evaluation: Patient is POD#1 Of C5 TO C6 and C6 TO C7 ACDF And Instrumentation .VSS. PO pain medication helping with pain.  Dressing to neck intact. GLORIA in place. Voiding per BRP.  Soft Collar at bedside. Education provided on blood pressure monitoring and pain control. Patient verbalized understanding. Possible d/c home today.

## 2024-11-08 ENCOUNTER — TELEPHONE (OUTPATIENT)
Dept: CARDIOLOGY | Facility: CLINIC | Age: 59
End: 2024-11-08

## 2024-11-08 NOTE — TELEPHONE ENCOUNTER
"  Caller: Rajesh Springer \"Rajesh Springer\"    Relationship to patient: Self    Best call back number: [phone]    New or established patient?  [] New  [x] Established    Date of discharge: 11/07/2024    Facility discharged from:     Diagnosis/Symptoms:         Specialty Only: Did you see a [facility] provider?    [x] Yes  [] No      Additional Details:WHEN DOES PT NEED TO F/U? HE IS CURRENTLY WEARING HEART MONITOR       "

## 2024-11-09 ENCOUNTER — NURSE TRIAGE (OUTPATIENT)
Dept: CALL CENTER | Facility: HOSPITAL | Age: 59
End: 2024-11-09
Payer: COMMERCIAL

## 2024-11-09 NOTE — TELEPHONE ENCOUNTER
"Reason for Disposition   [1] Caller has URGENT question AND [2] triager unable to answer question    Additional Information   Negative: Sounds like a life-threatening emergency to the triager   Negative: Chest pain   Negative: Difficulty breathing   Negative: Acting confused (e.g., disoriented, slurred speech) or excessively sleepy   Negative: Post-Op tonsil and adenoid surgery, symptoms or questions about   Negative: Surgical incision symptoms and questions   Negative: [1] Pain or burning with passing urine (urination) AND [2] male   Negative: [1] Pain or burning with passing urine (urination) AND [2] female   Negative: Constipation   Negative: New or worsening leg (calf, thigh) pain   Negative: New or worsening leg swelling   Negative: Dizziness is severe, or persists > 24 hours after surgery   Negative: Pain, redness, swelling, or pus at IV Site   Negative: Symptoms arising from use of a urinary catheter (e.g., Coude, Green)   Negative: Cast problems or questions   Negative: Medication question   Negative: [1] Widespread rash AND [2] bright red, sunburn-like   Negative: [1] SEVERE headache AND [2] after spinal (epidural) anesthesia   Negative: [1] Vomiting AND [2] persists > 4 hours   Negative: [1] Vomiting AND [2] abdomen looks much more swollen than usual   Negative: [1] Drinking very little AND [2] dehydration suspected (e.g., no urine > 12 hours, very dry mouth, very lightheaded)   Negative: Patient sounds very sick or weak to the triager   Negative: Sounds like a serious complication to the triager   Negative: Fever > 100.4 F (38.0 C)   Negative: [1] SEVERE post-op pain (e.g., excruciating, pain scale 8-10) AND [2] not controlled with pain medications    Answer Assessment - Initial Assessment Questions  1. SYMPTOM: \"What's the main symptom you're concerned about?\" (e.g., pain, fever, vomiting)      Shoulder and back pain  2. ONSET: \"When did   start?\"      This am  3. SURGERY: \"What surgery did you have?\"    " "  Cervical surgery  4. DATE of SURGERY: \"When was the surgery?\"       Wednesday  5. ANESTHESIA: \" What type of anesthesia did you have?\" (e.g., general, spinal, epidural, local)      yes  6. PAIN: \"Is there any pain?\" If Yes, ask: \"How bad is it?\"  (Scale 1-10; or mild, moderate, severe)      moderate  7. FEVER: \"Do you have a fever?\" If Yes, ask: \"What is your temperature, how was it measured, and when did it start?\"      deneis  8. VOMITING: \"Is there any vomiting?\" If Yes, ask: \"How many times?\"      denies  9. BLEEDING: \"Is there any bleeding?\" If Yes, ask: \"How much?\" and \"Where?\"      denies  10. OTHER SYMPTOMS: \"Do you have any other symptoms?\" (e.g., drainage from wound, painful urination, constipation)        no    Protocols used: Post-Op Symptoms and Questions-ADULT-AH    "

## 2024-11-11 ENCOUNTER — TELEPHONE (OUTPATIENT)
Dept: CARDIOLOGY | Facility: CLINIC | Age: 59
End: 2024-11-11
Payer: COMMERCIAL

## 2024-11-11 DIAGNOSIS — M47.12 CERVICAL SPONDYLOSIS WITH MYELOPATHY: ICD-10-CM

## 2024-11-11 DIAGNOSIS — M48.02 CERVICAL SPINAL STENOSIS: ICD-10-CM

## 2024-11-11 RX ORDER — HYDROCODONE BITARTRATE AND ACETAMINOPHEN 5; 325 MG/1; MG/1
1 TABLET ORAL EVERY 6 HOURS PRN
Qty: 25 TABLET | Refills: 0 | Status: SHIPPED | OUTPATIENT
Start: 2024-11-11

## 2024-11-11 NOTE — TELEPHONE ENCOUNTER
This matter is being addressed in an existing telephone encounter.  See encounter originating on 11/8/2024 for updates.    Thank you,  Rose Mary ROB RN  Triage Nurse [facility]  11/11/24 13:55 EST

## 2024-11-11 NOTE — TELEPHONE ENCOUNTER
Left voicemail for Rajesh Springer requesting callback.    HUB: please transfer to Triage if patient returns call    Thank you,  Rose Mary ROB RN  Triage Nurse [facility]  11/11/24   13:54 EST

## 2024-11-11 NOTE — TELEPHONE ENCOUNTER
Pt called back. Went over recommendations from Dr. Lopez. Scheduled to see Dr. Lopez in December. Pt verbalized understanding.    Thank you,    Alejandra Holland, RN  Triage [facility]  11/11/24 14:13 EST

## 2024-11-11 NOTE — TELEPHONE ENCOUNTER
Patient called requesting a refill on the pain medication Hydrocodone. Patient had surgery on 11/6. Please call and advise

## 2024-11-11 NOTE — TELEPHONE ENCOUNTER
Chart review shows Rajesh Springer was admitted from 11/6/ to 11/7 for procedure.  Patient developed afib postoperatively.  See consult note from Dr. Cao:        When should patient f/u in office?  Currently scheduled OV is not until 5/23/25.    Please let me know how you would like to proceed.    Thank you,  Rose Mary ROB RN  Triage Nurse [facility]  11/11/24  10:54 EST

## 2024-11-11 NOTE — TELEPHONE ENCOUNTER
Caller: Noemy Springer     Relationship: SPOUSE    Best call back number: [phone]      What was the call regarding: PLEASE ADVISE ON HOSPITAL FOLLOW UP APPOINTMENT.  PT IS CURRENTLY WEARING A 2 WEEK HOLTER    AVAILABILITY:  14TH AND 15TH AROUND NOON    21ST OR 22ND ALSO AROUND NOON

## 2024-11-13 ENCOUNTER — TELEPHONE (OUTPATIENT)
Dept: NEUROSURGERY | Facility: CLINIC | Age: 59
End: 2024-11-13
Payer: COMMERCIAL

## 2024-11-13 RX ORDER — METHYLPREDNISOLONE 4 MG/1
TABLET ORAL
Qty: 1 EACH | Refills: 0 | Status: SHIPPED | OUTPATIENT
Start: 2024-11-13

## 2024-11-13 NOTE — TELEPHONE ENCOUNTER
Patient called stating he is having some pain on the left side of shoulder that radiates into his left tricep. Patient states that he has been feeling pretty well up until yesterday when this uncomfortable pain began. Patient is 1 week post-op today, I asked the patient if he's been wearing his collar and he stated, not all the time. Patient also stated that he does not wear his collar while sleeping, I asked if he sleeps with his bed reclined and he stated, he does not. Patient stated that he has taken his medication and feels better now than before,but wasn't sure if this is something to be concerned about or not. Please call patient and advise.

## 2024-11-13 NOTE — TELEPHONE ENCOUNTER
"S/w patient and informed per Dr. Delatorre,    \"I do not think this is anything to be concerned about.  Go ahead and give him a Medrol Dosepak in case it flares up again.\"    Patient expressed understanding       "

## 2024-11-18 NOTE — PROGRESS NOTES
Subjective   Patient ID: Rajesh Springer is a 59 y.o. male is here today for 2 week PO follow-up. Patient had a Cervical 5 to cervical 6 and cervical 6 to cervical 7 anterior cervical discectomy, fusion and instrumentation on 11/6/2024    Today patient states that he has L sided neck pain that radiates to the L shoulder. Patient's incision looks healthy, no redness, no drainage, no swelling    History of Present Illness    This patient returns today.  He is doing fairly well.  He has no radiating pain down his arm but he does have some pain across the left shoulder into the tip of the left shoulder.  He has similar pain on the right but not as bad.  His incision looks great.    The following portions of the patient's history were reviewed and updated as appropriate: allergies, current medications, past family history, past medical history, past social history, past surgical history, and problem list.      Objective     There were no vitals filed for this visit.  There is no height or weight on file to calculate BMI.    Tobacco Use: Medium Risk (11/19/2024)    Patient History     Smoking Tobacco Use: Former     Smokeless Tobacco Use: Never     Passive Exposure: Past          Physical Exam    Neurological:      Mental Status: He is alert and oriented to person, place, and time.       Neurological Exam    Mental Status  Alert. Oriented to person, place, and time.            Assessment & Plan   Independent Review of Radiographic Studies:      I personally reviewed the images from the following studies.    He has no new imaging to review but I did look at his postoperative films which look okay.    Medical Decision Making:      I told the patient we will start him on physical therapy.  Will refill his narcotics 1 more time but then he needs to come off of those.  Will also give him another Medrol Dosepak to have on hand as it did help him when he took it.  Will see him back in a month with an x-ray.    Diagnoses and  all orders for this visit:    1. Follow-up examination following surgery (Primary)  -     HYDROcodone-acetaminophen (NORCO) 5-325 MG per tablet; Take 1 tablet by mouth Every 8 (Eight) Hours As Needed for Moderate Pain.  Dispense: 20 tablet; Refill: 0  -     XR Spine Cervical 2 or 3 View; Future  -     Ambulatory Referral to Physical Therapy for Evaluation & Treatment    Other orders  -     methylPREDNISolone (MEDROL) 4 MG dose pack; Take as directed on package instructions.  Dispense: 1 each; Refill: 0      Return in about 4 weeks (around 12/17/2024).

## 2024-11-19 ENCOUNTER — OFFICE VISIT (OUTPATIENT)
Dept: NEUROSURGERY | Facility: CLINIC | Age: 59
End: 2024-11-19
Payer: COMMERCIAL

## 2024-11-19 DIAGNOSIS — Z09 FOLLOW-UP EXAMINATION FOLLOWING SURGERY: Primary | ICD-10-CM

## 2024-11-19 PROCEDURE — 99024 POSTOP FOLLOW-UP VISIT: CPT | Performed by: NEUROLOGICAL SURGERY

## 2024-11-19 RX ORDER — METHYLPREDNISOLONE 4 MG/1
TABLET ORAL
Qty: 1 EACH | Refills: 0 | Status: SHIPPED | OUTPATIENT
Start: 2024-11-19

## 2024-11-19 RX ORDER — HYDROCODONE BITARTRATE AND ACETAMINOPHEN 5; 325 MG/1; MG/1
1 TABLET ORAL EVERY 8 HOURS PRN
Qty: 20 TABLET | Refills: 0 | Status: SHIPPED | OUTPATIENT
Start: 2024-11-19

## 2024-11-21 ENCOUNTER — TELEPHONE (OUTPATIENT)
Dept: NEUROSURGERY | Facility: CLINIC | Age: 59
End: 2024-11-21

## 2024-11-21 NOTE — TELEPHONE ENCOUNTER
Caller: FRANKLYN CÁRDENAS    Relationship: WIFE    Best call back number: [phone]    What orders are you requesting (i.e. lab or imaging): PHYS THERAPY      Where will you receive your lab/imaging services: [facility]    Additional notes:PLEASE FAX REFERRAL -[phone]  PATIENT IS SCHEDULED ON WED 11-27-24 FOR AN APPOINTMENT WITH MEENAKSHI EGAN.

## 2024-11-25 LAB
CV ZIO AF AFL AVG BPM: 116 BPM
CV ZIO AF AFL BPM HIGH: 151 BPM
CV ZIO AF AFL BPM LOW: 89 BPM
CV ZIO AF AFL F EPI AVG BPM: 137 BPM
CV ZIO AF AFL F EPI BPM HIGH: 151 BPM
CV ZIO AF AFL F EPI BPM LOW: 98 BPM
CV ZIO AF AFL F EPI DT: NORMAL
CV ZIO AF AFL L EPI AVG BPM: 113 BPM
CV ZIO AF AFL L EPI BPM HIGH: 142 BPM
CV ZIO AF AFL L EPI BPM LOW: 89 BPM
CV ZIO AF AFL L EPI DUR: 1147.6
CV ZIO AF AFL L EPI END: NORMAL
CV ZIO AF AFL L EPI START: NORMAL
CV ZIO AF AFL PERCENT: <1 %
CV ZIO AF AFL S EPI AVG BPM: 113 BPM
CV ZIO AF AFL S EPI BPM HIGH: 142 BPM
CV ZIO AF AFL S EPI BPM LOW: 89 BPM
CV ZIO AF AFL S EPI DT: NORMAL
CV ZIO AF AFL SYMPT IN PT: NORMAL
CV ZIO BASELINE AVG BPM: 61 BPM
CV ZIO BASELINE BPM HIGH: 151 BPM
CV ZIO BASELINE BPM LOW: 42 BPM
CV ZIO DEVICE ANALYSIS TIME: NORMAL
CV ZIO ECT SVE COUNT: 826 EPISODES
CV ZIO ECT SVE CPLT COUNT: 57 EPISODES
CV ZIO ECT SVE CPLT FREQ: NORMAL
CV ZIO ECT SVE FREQ: NORMAL
CV ZIO ECT SVE TPLT COUNT: 11 EPISODES
CV ZIO ECT SVE TPLT FREQ: NORMAL
CV ZIO ECT VE COUNT: 385 EPISODES
CV ZIO ECT VE CPLT COUNT: 0 EPISODES
CV ZIO ECT VE CPLT FREQ: 0
CV ZIO ECT VE FREQ: NORMAL
CV ZIO ECT VE TPLT COUNT: 1 EPISODES
CV ZIO ECT VE TPLT FREQ: NORMAL
CV ZIO ECTOPIC SVE COUPLET RAW PERCENT: 0.01 %
CV ZIO ECTOPIC SVE ISOLATED PERCENT: 0.07 %
CV ZIO ECTOPIC SVE TRIPLET RAW PERCENT: 0 %
CV ZIO ECTOPIC VE COUPLET RAW PERCENT: 0 %
CV ZIO ECTOPIC VE ISOLATED PERCENT: 0.03 %
CV ZIO ECTOPIC VE TRIPLET RAW PERCENT: 0 %
CV ZIO ENROLLMENT END: NORMAL
CV ZIO ENROLLMENT START: NORMAL
CV ZIO IRREG TYPE: NORMAL
CV ZIO PATIENT EVENTS DIARIES: 0
CV ZIO PATIENT EVENTS TRIGGERS: 9
CV ZIO PAUSE COUNT: 0
CV ZIO PRESCRIPTION STATUS: NORMAL
CV ZIO SVT AVG BPM: 107 BPM
CV ZIO SVT BPM HIGH: 121 BPM
CV ZIO SVT BPM LOW: 75 BPM
CV ZIO SVT COUNT: 1
CV ZIO SVT F EPI AVG BPM: 107 BPM
CV ZIO SVT F EPI BEATS: 11 BEATS
CV ZIO SVT F EPI BPM HIGH: 121 BPM
CV ZIO SVT F EPI BPM LOW: 75 BPM
CV ZIO SVT F EPI DUR: 6.1 SEC
CV ZIO SVT F EPI END: NORMAL
CV ZIO SVT F EPI START: NORMAL
CV ZIO SVT L EPI AVG BPM: 107 BPM
CV ZIO SVT L EPI BEATS: 11 BEATS
CV ZIO SVT L EPI BPM HIGH: 121 BPM
CV ZIO SVT L EPI BPM LOW: 75 BPM
CV ZIO SVT L EPI DUR: 6.1 SEC
CV ZIO SVT L EPI END: NORMAL
CV ZIO SVT L EPI START: NORMAL
CV ZIO TOTAL  ENROLLMENT PERIOD: NORMAL
CV ZIO VT COUNT: 0

## 2024-11-27 ENCOUNTER — TELEPHONE (OUTPATIENT)
Dept: CARDIOLOGY | Facility: CLINIC | Age: 59
End: 2024-11-27
Payer: COMMERCIAL

## 2024-11-27 NOTE — TELEPHONE ENCOUNTER
----- Message from John Lopez sent at 11/26/2024  5:27 PM EST -----  Please let patient know that his heart monitor test did also reveal short episodes of atrial fibrillation.  I think it is reasonable to continue the blood thinner, Eliquis, for now.  However, thankfully, the atrial fibrillation episodes were very short, 19 minutes in total, and this is probably low risk for future strokes.  We can discuss the role of his blood thinners moving forward at the next clinic visit, but for now can continue the current course of treatment.  Thank you very much.

## 2024-11-27 NOTE — TELEPHONE ENCOUNTER
I tried to call Rajesh Springer but there was no answer.  Left a voicemail asking patient to call back.  Will continue to try to reach pt.    HUB- if pt calls back, please transfer through to triage.    Thank you,    Cecilia GOODE, RN  Triage [facility] [place]  11/27/24 08:23 EST

## 2024-11-27 NOTE — TELEPHONE ENCOUNTER
Pt called back. Went over results and recommendations. Pt verbalized understanding.    Thank you,    Alejandra Holland, RN  Triage [facility] [place]  11/27/24 10:17 EST

## 2024-12-13 ENCOUNTER — OFFICE VISIT (OUTPATIENT)
Dept: CARDIOLOGY | Facility: CLINIC | Age: 59
End: 2024-12-13
Payer: COMMERCIAL

## 2024-12-13 VITALS
BODY MASS INDEX: 27.14 KG/M2 | WEIGHT: 189.6 LBS | DIASTOLIC BLOOD PRESSURE: 82 MMHG | SYSTOLIC BLOOD PRESSURE: 130 MMHG | OXYGEN SATURATION: 96 % | HEIGHT: 70 IN

## 2024-12-13 DIAGNOSIS — I48.0 PAROXYSMAL ATRIAL FIBRILLATION: ICD-10-CM

## 2024-12-13 DIAGNOSIS — I25.10 CORONARY ARTERY DISEASE INVOLVING NATIVE CORONARY ARTERY OF NATIVE HEART WITHOUT ANGINA PECTORIS: Primary | ICD-10-CM

## 2024-12-13 DIAGNOSIS — I10 PRIMARY HYPERTENSION: ICD-10-CM

## 2024-12-13 RX ORDER — ASPIRIN 81 MG/1
81 TABLET ORAL DAILY
COMMUNITY

## 2024-12-13 NOTE — PROGRESS NOTES
[facility]    Subjective:     Encounter Date:12/13/24      Patient ID: Rajesh Springer is a 59 y.o. male.    Chief Complaint:   Chief Complaint   Patient presents with    Coronary artery disease involving native coronary artery of     Patient is in the office today to follow up on his zio results.       History of Present Illness    Rajesh Springer is a 59 y.o. gentleman who presents for further evaluation of preop evaluation before a planned cervical spinal surgery.  He has a past medical history of coronary artery disease but has not been seen since 2016.  He previously followed with Dr. Alvarez.  He follows routinely with his PCP.    He ultimately saw providers at [place], Dr. Moffett, he underwent evaluation for recurrent atypical chest pain.  He had a left heart catheterization April 2015 where he had a stent placed in the first OM.  10% left main disease, 40% LAD, 70% first diagonal, 50 to 60% mid circumflex and 90% proximal OM1.  20% RCA.    He subsequently has received stents to his circumflex, as well as diagonal branch.    2.0 x 23 vision BMS to D1, 2.25 x 18 Xience YOLANDA to OM 2, 3.0 x 18 Xience YOLANDA to OM1    The stress test at the time that prompted the cath showed a moderate area of ischemia in the anterior/anteroseptal region.  He did have some chest pain which is likely reflective of GERD.    He had recurrent chest pain that occurred in 2017 at [place].  Repeat stress test at that time was normal.  Baseline he is physically active as a      He does have some cervical spinal and underwent an anterior approach cervical surgery with Dr. Robb and tolerated well.  There was some postoperative A-fib, correlating with his palpitation episodes he has had in the past.       The palpitations were similar to the palpitations he had when he took prednisone for a viral URI.    He wore a 2-week Holter after his surgery given the A-fib episode that occurred postsurgery, and it  "revealed a less than 1% burden of A-fib the longest lasting 16 minutes.  He presents today for follow-up.    Previous Cardiac Testing:  Per above.    Nuclear perfusion stress test June 2017 at [facility].    . Normal Lexiscan stress Cardiolite examination.   2. No evidence of ischemia.   3. No evidence of infarction.   4. Gated wall motion study showed normal left ventricular wall motion and   systolic wall thickening in all left ventricular segments, with a   calculated post-stress left ventricular ejection fraction of 53%.   5. Negative pharmacologic stress ECG for ischemia with Lexiscan.     The following portions of the patient's history were reviewed and updated as appropriate: allergies, current medications, past family history, past medical history, past social history, past surgical history and problem list.    Past Medical History:   Diagnosis Date    Abnormal ECG [id number]    Alcohol abuse     PT DENIED HISTORY OF ALCOHOL ABUSE    Anxiety     CAD (coronary artery disease)     Cervical stenosis of spine     Chest pain     Enlarged prostate     GERD (gastroesophageal reflux disease)     Hyperlipidemia     Hypertension     Sleep apnea     CPAP    Slow to wake up after anesthesia        Past Surgical History:   Procedure Laterality Date    COLONOSCOPY      CORONARY STENT PLACEMENT  2015    proximal om-1    HIP ARTHROPLASTY Left     KNEE ARTHROSCOPY Right     TONSILLECTOMY           Procedures        Objective:     Vitals:    12/13/24 1316   BP: 130/82   BP Location: Left arm   Patient Position: Sitting   Cuff Size: Adult   SpO2: 96%   Weight: 86 kg (189 lb 9.6 oz)   Height: 177.8 cm (70\")         Constitutional:       Appearance: Healthy appearance. Not in distress.   HENT:      Head:      Comments: Right anterior cervical spinal scar appears to be well-healing.  Neck:      Vascular: JVD normal.   Pulmonary:      Effort: Pulmonary effort is normal.      Breath sounds: Normal breath sounds. "   Cardiovascular:      PMI at left midclavicular line. Normal rate. Regular rhythm. Normal S2.       Murmurs: There is no murmur.      Comments:    Pulses:     Intact distal pulses.   Edema:     Peripheral edema absent.   Skin:     General: Skin is warm and dry.   Neurological:      General: No focal deficit present.      Mental Status: Alert, oriented to person, place, and time and oriented to person, place and time.   Psychiatric:         Mood and Affect: Mood and affect normal.         Lab Review:       BUN   Date Value Ref Range Status   05/11/2022 18 8 - 26 mg/dL Final     Creatinine   Date Value Ref Range Status   05/11/2022 0.85 0.73 - 1.18 mg/dL Final     Potassium   Date Value Ref Range Status   05/11/2022 4.6 3.5 - 5.1 mmol/L Final     ALT (SGPT)   Date Value Ref Range Status   05/11/2022 22 10 - 50 U/L Final     AST (SGOT)   Date Value Ref Range Status   05/11/2022 35 10 - 50 U/L Final         Performed        Assessment:          Diagnosis Plan   1. Coronary artery disease involving native coronary artery of native heart without angina pectoris        2. Primary hypertension        3. Paroxysmal atrial fibrillation                 Plan:         Coronary artery disease: Status post PCI to OM, diagonal 2015.  Stress testing nonischemic in 2017.  ECG today is unchanged from ECGs dating back to 2018.   Continue aspirin 81 with Eliquis.,  He does have a 2 mm, small caliber stent in a diagonal branch so I would favor a single antiplatelet with Eliquis if possible   Free of angina today  Reassess November 2024: Normal LVEF 65%, normal diastolic function and normal RVSP.  Hyperlipidemia: Continue Lipitor 40.  LDL is 90, slightly suboptimal but has been better in the past.  The patient had only recently resumed his lipid-lowering therapy  Recommend reassessment in 6 months if not checked by then.  Palpitations, consistent with paroxysmal atrial fibrillation: Occurred in the setting of anticholinergics due to  recent URI, as well as steroid use.    A-fib noted post operative after his cervical spinal surgery  [id number] is 2  His burden is low, will reassess in 6 months after he tries to maintain lifestyle modification including reduction in alcohol use, exercise, and wear his Apple Watch and as long as the arrhythmia burden is low we can consider discontinuation at that time  Left anterior fascicular block/abnormal ECG.  Stable.  No changes from previous       RTC 6 months.  Discussed discontinuation of Eliquis at that time.    John Lopez MD  [facility]  12/13/24  11:17 EST       Current Outpatient Medications:     acetaminophen (TYLENOL) 500 MG tablet, Take 1 tablet by mouth Every 6 (Six) Hours As Needed for Mild Pain., Disp: , Rfl:     apixaban (ELIQUIS) 5 MG tablet tablet, Take 1 tablet by mouth 2 (Two) Times a Day., Disp: 180 tablet, Rfl: 3    ascorbic acid (VITAMIN C) 1000 MG tablet, Take 1 tablet by mouth Daily., Disp: , Rfl:     atorvastatin (LIPITOR) 40 MG tablet, Take 1 tablet by mouth Every Night., Disp: , Rfl:     Capsicum, Cayenne, (CAYENNE PO), Take  by mouth Daily., Disp: , Rfl:     carvedilol (COREG) 3.125 MG tablet, Take 1 tablet by mouth 2 (Two) Times a Day With Meals., Disp: , Rfl:     cholecalciferol (Vitamin D) 25 MCG (1000 UT) tablet, Take 1 tablet by mouth Daily., Disp: , Rfl:     Coenzyme Q10 10 MG capsule, Take 10 mg by mouth Daily. PT HOLDING FOR SURGERY, Disp: , Rfl:     Creatine Monohydrate powder, Use., Disp: , Rfl:     Glucos-Chond-Hyal Ac-Ca Fructo (MOVE FREE JOINT HEALTH ADVANCE PO), Take  by mouth Daily., Disp: , Rfl:     Magnesium Citrate 200 MG tablet, Take  by mouth Daily., Disp: , Rfl:     methocarbamol (ROBAXIN) 750 MG tablet, Take 1 tablet by mouth 4 (Four) Times a Day As Needed for Muscle Spasms., Disp: 40 tablet, Rfl: 0    methylPREDNISolone (MEDROL) 4 MG dose pack, Take as directed on package instructions., Disp: 1 each, Rfl: 0    Misc Natural Products (BEET  ROOT PO), Take  by mouth Daily., Disp: , Rfl:     multivitamin with minerals (MULTIVITAMIN ADULT PO), Take 1 tablet by mouth Daily. PT HOLDING FOR SURGERY, Disp: , Rfl:     tadalafil (Cialis) 5 MG tablet, Take 1 tablet by mouth 2 (Two) Times a Day. TAKES FOR PROSTATE, Disp: , Rfl:     Turmeric 5-1000 MG capsule, Take  by mouth Daily., Disp: , Rfl:     aspirin 81 MG EC tablet, Take 1 tablet by mouth Daily., Disp: , Rfl:     HYDROcodone-acetaminophen (NORCO) 5-325 MG per tablet, Take 1 tablet by mouth Every 8 (Eight) Hours As Needed for Moderate Pain. (Patient not taking: Reported on 12/13/2024), Disp: 20 tablet, Rfl: 0         Return in about 6 months (around 6/13/2025).      Part of this note may be an electronic transcription/translation of spoken language to printed text using the Dragon Dictation System.

## 2024-12-19 ENCOUNTER — OFFICE VISIT (OUTPATIENT)
Dept: NEUROSURGERY | Facility: CLINIC | Age: 59
End: 2024-12-19
Payer: COMMERCIAL

## 2024-12-19 VITALS — HEART RATE: 57 BPM | OXYGEN SATURATION: 95 % | DIASTOLIC BLOOD PRESSURE: 78 MMHG | SYSTOLIC BLOOD PRESSURE: 132 MMHG

## 2024-12-19 DIAGNOSIS — Z09 FOLLOW-UP EXAMINATION FOLLOWING SURGERY: Primary | ICD-10-CM

## 2024-12-19 PROCEDURE — 99024 POSTOP FOLLOW-UP VISIT: CPT | Performed by: NEUROLOGICAL SURGERY

## 2024-12-19 NOTE — PROGRESS NOTES
Subjective   Patient ID: Rajesh Springer is a 59 y.o. male is here today for 1 month follow-up. Patient had C5-C6 and C6-C7 anterior cervical discectomy, fusion and instrumentation done on 11/06/2024.    Today patient states pain in R shoulder.    History of Present Illness    This patient returns today.  He is doing well.  He has a little pain in his right shoulder but nothing severe.  He has no pain down his arm.    The following portions of the patient's history were reviewed and updated as appropriate: allergies, current medications, past family history, past medical history, past social history, past surgical history, and problem list.      Objective     Vitals:    12/19/24 1217   BP: 132/78   Pulse: 57   SpO2: 95%   PainSc:   3     There is no height or weight on file to calculate BMI.    Tobacco Use: Medium Risk (12/13/2024)    Patient History     Smoking Tobacco Use: Former     Smokeless Tobacco Use: Never     Passive Exposure: Past          Physical Exam    Neurological:      Mental Status: He is alert and oriented to person, place, and time.       Neurological Exam    Mental Status  Alert. Oriented to person, place, and time.            Assessment & Plan   Independent Review of Radiographic Studies:      I personally reviewed the images from the following studies.    The patient did not get his x-rays done today before his visit.  We sent him to the hospital at the end of the visit and will call him with the results.    Medical Decision Making:      I told the patient he can gradually return to normal activities.  He is to call if any problems develop.    Diagnoses and all orders for this visit:    1. Follow-up examination following surgery (Primary)      Return if symptoms worsen or fail to improve.

## 2025-01-03 ENCOUNTER — HOSPITAL ENCOUNTER (OUTPATIENT)
Dept: GENERAL RADIOLOGY | Facility: HOSPITAL | Age: 60
Discharge: HOME | End: 2025-01-03
Admitting: NEUROLOGICAL SURGERY
Payer: COMMERCIAL

## 2025-01-03 DIAGNOSIS — Z09 FOLLOW-UP EXAMINATION FOLLOWING SURGERY: ICD-10-CM

## 2025-01-03 PROCEDURE — 72040 X-RAY EXAM NECK SPINE 2-3 VW: CPT

## 2025-01-07 ENCOUNTER — TELEPHONE (OUTPATIENT)
Dept: NEUROSURGERY | Facility: CLINIC | Age: 60
End: 2025-01-07
Payer: COMMERCIAL

## 2025-01-07 NOTE — TELEPHONE ENCOUNTER
"LM for patient informing per Dr. Delatorre,    \"Please let this patient know that his x-rays are okay. \"    ----- Message from Corey Delatorre sent at 1/6/2025  9:11 AM EST -----  Please let this patient know that his x-rays are okay.  ----- Message -----  From: Interface, Rad Results Circle In  Sent: 1/6/2025   8:26 AM EST  To: Corey Delatorre MD  "

## 2025-05-23 ENCOUNTER — OFFICE VISIT (OUTPATIENT)
Dept: CARDIOLOGY | Age: 60
End: 2025-05-23
Payer: COMMERCIAL

## 2025-05-23 VITALS
HEIGHT: 70 IN | DIASTOLIC BLOOD PRESSURE: 78 MMHG | WEIGHT: 196.4 LBS | BODY MASS INDEX: 28.12 KG/M2 | SYSTOLIC BLOOD PRESSURE: 132 MMHG | OXYGEN SATURATION: 96 %

## 2025-05-23 DIAGNOSIS — I48.0 PAROXYSMAL ATRIAL FIBRILLATION: Primary | ICD-10-CM

## 2025-05-23 DIAGNOSIS — I25.10 CORONARY ARTERY DISEASE INVOLVING NATIVE CORONARY ARTERY OF NATIVE HEART WITHOUT ANGINA PECTORIS: ICD-10-CM

## 2025-05-23 PROCEDURE — 99214 OFFICE O/P EST MOD 30 MIN: CPT | Performed by: STUDENT IN AN ORGANIZED HEALTH CARE EDUCATION/TRAINING PROGRAM

## 2025-05-23 NOTE — PATIENT INSTRUCTIONS
"I would try to make sure to wear your watch the most that she can if you need to change her settings to quieted down overnight, that might be worthwhile.  Look on your eye health juliocesar and look at the \"A-fib frequency\" graph and as long as it is staying less than 5%, I think we can stay off the Eliquis.    If you have any recurrent palpitation episodes, the watch is detecting possible atrial fibrillation, and its lasting for more than 4 hours, let us know.  At that point, I would like for you to restart the Eliquis, and then call our office..  "

## 2025-05-23 NOTE — PROGRESS NOTES
[facility]    Subjective:     Encounter Date:05/23/25      Patient ID: Rajesh Springer is a 59 y.o. male.    Chief Complaint:   Chief Complaint   Patient presents with    Coronary artery disease involving native coronary artery of     Patient is in the office today for his 6 month follow up appointment.       History of Present Illness    Rajesh Springer is a 59 y.o. gentleman who presents for further evaluation of preop evaluation before a planned cervical spinal surgery.  He has a past medical history of coronary artery disease but has not been seen since 2016.  He previously followed with Dr. Alvarez.  He follows routinely with his PCP.    He ultimately saw providers at [place], Dr. Moffett, he underwent evaluation for recurrent atypical chest pain.  He had a left heart catheterization April 2015 where he had a stent placed in the first OM.  10% left main disease, 40% LAD, 70% first diagonal, 50 to 60% mid circumflex and 90% proximal OM1.  20% RCA.    He subsequently has received stents to his circumflex, as well as diagonal branch.    2.0 x 23 vision BMS to D1, 2.25 x 18 Xience YOLANDA to OM 2, 3.0 x 18 Xience YOLANDA to OM1    The stress test at the time that prompted the cath showed a moderate area of ischemia in the anterior/anteroseptal region.  He did have some chest pain which is likely reflective of GERD.    He had recurrent chest pain that occurred in 2017 at [place].  Repeat stress test at that time was normal.  Baseline he is physically active as a      He does have some cervical spinal and underwent an anterior approach cervical surgery with Dr. Robb and tolerated well.  There was some postoperative A-fib, correlating with his palpitation episodes he has had in the past.       The palpitations were similar to the palpitations he had when he took prednisone for a viral URI.    He wore a 2-week Holter after his surgery given the A-fib episode that occurred postsurgery,  and it revealed a less than 1% burden of A-fib the longest lasting 16 minutes.  H    He has no other complaints today.  He has no awareness of an irregular heart beat.  He has an Apple Watch but he does not wear it very often.  He self discontinued the Eliquis    Previous Cardiac Testing:  Per above.    Nuclear perfusion stress test June 2017 at [facility].    . Normal Lexiscan stress Cardiolite examination.   2. No evidence of ischemia.   3. No evidence of infarction.   4. Gated wall motion study showed normal left ventricular wall motion and   systolic wall thickening in all left ventricular segments, with a   calculated post-stress left ventricular ejection fraction of 53%.   5. Negative pharmacologic stress ECG for ischemia with Lexiscan.     The following portions of the patient's history were reviewed and updated as appropriate: allergies, current medications, past family history, past medical history, past social history, past surgical history and problem list.    Past Medical History:   Diagnosis Date    Abnormal ECG [id number]    Alcohol abuse     PT DENIED HISTORY OF ALCOHOL ABUSE    Anxiety     CAD (coronary artery disease)     Cervical stenosis of spine     Chest pain     Enlarged prostate     GERD (gastroesophageal reflux disease)     Hyperlipidemia     Hypertension     Sleep apnea     CPAP    Slow to wake up after anesthesia        Past Surgical History:   Procedure Laterality Date    ANTERIOR CERVICAL DISCECTOMY W/ FUSION N/A 11/6/2024    Procedure: Cervical 5 to cervical 6 and cervical 6 to cervical 7 anterior cervical discectomy, fusion and instrumentation;  Surgeon: Corey Delatorre MD;  Location: [facility];  Service: Neurosurgery;  Laterality: N/A;    COLONOSCOPY      CORONARY STENT PLACEMENT  2015    proximal om-1    HIP ARTHROPLASTY Left     KNEE ARTHROSCOPY Right     TONSILLECTOMY           Procedures        Objective:     Vitals:    05/23/25 1355   BP: 132/78   BP Location: Left arm  "  Patient Position: Sitting   Cuff Size: Adult   SpO2: 96%   Weight: 89.1 kg (196 lb 6.4 oz)   Height: 177.8 cm (70\")           Constitutional:       Appearance: Healthy appearance. Not in distress.   Neck:      Vascular: JVD normal.   Pulmonary:      Effort: Pulmonary effort is normal.      Breath sounds: Normal breath sounds.   Cardiovascular:      PMI at left midclavicular line. Normal rate. Regular rhythm. Normal S2.       Murmurs: There is no murmur.      Comments:    Pulses:     Intact distal pulses.   Edema:     Peripheral edema absent.   Skin:     General: Skin is warm and dry.   Neurological:      General: No focal deficit present.      Mental Status: Alert, oriented to person, place, and time and oriented to person, place and time.   Psychiatric:         Mood and Affect: Mood and affect normal.         Lab Review:       BUN   Date Value Ref Range Status   05/11/2022 18 8 - 26 mg/dL Final     Creatinine   Date Value Ref Range Status   05/11/2022 0.85 0.73 - 1.18 mg/dL Final     Potassium   Date Value Ref Range Status   05/11/2022 4.6 3.5 - 5.1 mmol/L Final     ALT (SGPT)   Date Value Ref Range Status   05/11/2022 22 10 - 50 U/L Final     AST (SGOT)   Date Value Ref Range Status   05/11/2022 35 10 - 50 U/L Final         Performed        Assessment:          Diagnosis Plan   1. Paroxysmal atrial fibrillation        2. Coronary artery disease involving native coronary artery of native heart without angina pectoris                   Plan:         Coronary artery disease: Status post PCI to , diagonal 2015.  Stress testing nonischemic in 2017.  ECG today is unchanged from ECGs dating back to 2018.   Continue aspirin 81 with Eliquis.,  He does have a 2 mm, small caliber stent in a diagonal branch so I would favor a single antiplatelet with Eliquis if possible   Free of angina today  Echo with normal EF, no evidence of wall motion abnormality   hyperlipidemia: Continue Lipitor 40.  LDL is 90, slightly suboptimal " but has been better in the past.  The patient had only recently resumed his lipid-lowering therapy goal LDL less than 70.  Palpitations, consistent with paroxysmal atrial fibrillation: Occurred in the setting of anticholinergics due to recent URI, as well as steroid use.  And posterior post surgery  [id number] is 2, but the burden is low.  He has an Apple Watch with arrhythmia detection.  I showed him how to set it up and encouraged him to wear his watch more continuously.  If his A-fib burden is greater than 5% he may need to go back on Eliquis but for now I think is reasonable to monitor afterward.  He was instructed to start his Eliquis for any new palpitations or demonstrated A-fib lasting more than 4 hours.  Left anterior fascicular block/abnormal ECG.  Stable.  No changes from previous       RTC 6 months.  Discussed discontinuation of Eliquis at that time.    John Lopez MD  [facility]  05/23/25  11:17 EST       Current Outpatient Medications:     acetaminophen (TYLENOL) 500 MG tablet, Take 1 tablet by mouth Every 6 (Six) Hours As Needed for Mild Pain., Disp: , Rfl:     apixaban (ELIQUIS) 5 MG tablet tablet, Take 1 tablet by mouth 2 (Two) Times a Day., Disp: 180 tablet, Rfl: 3    ascorbic acid (VITAMIN C) 1000 MG tablet, Take 1 tablet by mouth Daily., Disp: , Rfl:     aspirin 81 MG EC tablet, Take 1 tablet by mouth Daily., Disp: , Rfl:     atorvastatin (LIPITOR) 40 MG tablet, Take 1 tablet by mouth Every Night., Disp: , Rfl:     Capsicum, Cayenne, (CAYENNE PO), Take  by mouth Daily., Disp: , Rfl:     carvedilol (COREG) 3.125 MG tablet, Take 1 tablet by mouth 2 (Two) Times a Day With Meals., Disp: , Rfl:     cholecalciferol (Vitamin D) 25 MCG (1000 UT) tablet, Take 1 tablet by mouth Daily., Disp: , Rfl:     Coenzyme Q10 10 MG capsule, Take 10 mg by mouth Daily. PT HOLDING FOR SURGERY, Disp: , Rfl:     Creatine Monohydrate powder, Use., Disp: , Rfl:     Glucos-Chond-Hyal Ac-Ca Fructo (MOVE  FREE JOINT HEALTH ADVANCE PO), Take  by mouth Daily., Disp: , Rfl:     Magnesium Citrate 200 MG tablet, Take  by mouth Daily., Disp: , Rfl:     Misc Natural Products (BEET ROOT PO), Take  by mouth Daily., Disp: , Rfl:     multivitamin with minerals (MULTIVITAMIN ADULT PO), Take 1 tablet by mouth Daily. PT HOLDING FOR SURGERY, Disp: , Rfl:     tadalafil (Cialis) 5 MG tablet, Take 1 tablet by mouth 2 (Two) Times a Day. TAKES FOR PROSTATE, Disp: , Rfl:     Turmeric 5-1000 MG capsule, Take  by mouth Daily., Disp: , Rfl:     HYDROcodone-acetaminophen (NORCO) 5-325 MG per tablet, Take 1 tablet by mouth Every 8 (Eight) Hours As Needed for Moderate Pain. (Patient not taking: Reported on 12/13/2024), Disp: 20 tablet, Rfl: 0    methocarbamol (ROBAXIN) 750 MG tablet, Take 1 tablet by mouth 4 (Four) Times a Day As Needed for Muscle Spasms., Disp: 40 tablet, Rfl: 0    methylPREDNISolone (MEDROL) 4 MG dose pack, Take as directed on package instructions., Disp: 1 each, Rfl: 0    oseltamivir (TAMIFLU) 75 MG capsule, Take 1 capsule by mouth 2 (Two) Times a Day., Disp: 10 capsule, Rfl: 0         Return in about 6 months (around 11/23/2025).      Part of this note may be an electronic transcription/translation of spoken language to printed text using the Dragon Dictation System.